# Patient Record
Sex: FEMALE | Race: BLACK OR AFRICAN AMERICAN | NOT HISPANIC OR LATINO
[De-identification: names, ages, dates, MRNs, and addresses within clinical notes are randomized per-mention and may not be internally consistent; named-entity substitution may affect disease eponyms.]

---

## 2017-01-27 ENCOUNTER — APPOINTMENT (OUTPATIENT)
Dept: INTERNAL MEDICINE | Facility: CLINIC | Age: 67
End: 2017-01-27

## 2017-01-27 VITALS
HEART RATE: 70 BPM | WEIGHT: 220 LBS | SYSTOLIC BLOOD PRESSURE: 146 MMHG | DIASTOLIC BLOOD PRESSURE: 84 MMHG | BODY MASS INDEX: 33.34 KG/M2 | HEIGHT: 68 IN | TEMPERATURE: 97.8 F | OXYGEN SATURATION: 97 %

## 2017-01-30 LAB
ALBUMIN SERPL ELPH-MCNC: 4 G/DL
ALP BLD-CCNC: 61 U/L
ALT SERPL-CCNC: 10 U/L
ANION GAP SERPL CALC-SCNC: 17 MMOL/L
AST SERPL-CCNC: 17 U/L
BASOPHILS # BLD AUTO: 0.03 K/UL
BASOPHILS NFR BLD AUTO: 0.4 %
BILIRUB SERPL-MCNC: 0.3 MG/DL
BUN SERPL-MCNC: 15 MG/DL
CALCIUM SERPL-MCNC: 9.8 MG/DL
CHLORIDE SERPL-SCNC: 103 MMOL/L
CHOLEST SERPL-MCNC: 196 MG/DL
CHOLEST/HDLC SERPL: 2.4 RATIO
CO2 SERPL-SCNC: 24 MMOL/L
CREAT SERPL-MCNC: 1.01 MG/DL
EOSINOPHIL # BLD AUTO: 0.22 K/UL
EOSINOPHIL NFR BLD AUTO: 3.3 %
GLUCOSE SERPL-MCNC: 87 MG/DL
HBA1C MFR BLD HPLC: 5.6 %
HCT VFR BLD CALC: 39.8 %
HDLC SERPL-MCNC: 82 MG/DL
HGB BLD-MCNC: 12.6 G/DL
HIV1+2 AB SPEC QL IA.RAPID: NONREACTIVE
IMM GRANULOCYTES NFR BLD AUTO: 0.1 %
LDLC SERPL CALC-MCNC: 103 MG/DL
LYMPHOCYTES # BLD AUTO: 3.07 K/UL
LYMPHOCYTES NFR BLD AUTO: 45.5 %
MAN DIFF?: NORMAL
MCHC RBC-ENTMCNC: 26.2 PG
MCHC RBC-ENTMCNC: 31.7 GM/DL
MCV RBC AUTO: 82.7 FL
MONOCYTES # BLD AUTO: 0.38 K/UL
MONOCYTES NFR BLD AUTO: 5.6 %
NEUTROPHILS # BLD AUTO: 3.03 K/UL
NEUTROPHILS NFR BLD AUTO: 45.1 %
PLATELET # BLD AUTO: 289 K/UL
POTASSIUM SERPL-SCNC: 4.1 MMOL/L
PROT SERPL-MCNC: 7.9 G/DL
RBC # BLD: 4.81 M/UL
RBC # FLD: 15.8 %
SODIUM SERPL-SCNC: 144 MMOL/L
TRIGL SERPL-MCNC: 57 MG/DL
TSH SERPL-ACNC: 1.89 UIU/ML
WBC # FLD AUTO: 6.74 K/UL

## 2017-06-06 ENCOUNTER — APPOINTMENT (OUTPATIENT)
Dept: INTERNAL MEDICINE | Facility: CLINIC | Age: 67
End: 2017-06-06

## 2017-06-06 VITALS
TEMPERATURE: 98.3 F | BODY MASS INDEX: 35.16 KG/M2 | SYSTOLIC BLOOD PRESSURE: 128 MMHG | DIASTOLIC BLOOD PRESSURE: 72 MMHG | HEART RATE: 72 BPM | WEIGHT: 232 LBS | OXYGEN SATURATION: 97 % | HEIGHT: 68 IN

## 2017-06-29 ENCOUNTER — RX RENEWAL (OUTPATIENT)
Age: 67
End: 2017-06-29

## 2017-07-17 ENCOUNTER — APPOINTMENT (OUTPATIENT)
Dept: INTERNAL MEDICINE | Facility: CLINIC | Age: 67
End: 2017-07-17

## 2017-07-17 VITALS
HEART RATE: 78 BPM | BODY MASS INDEX: 34.86 KG/M2 | WEIGHT: 230 LBS | DIASTOLIC BLOOD PRESSURE: 78 MMHG | HEIGHT: 68 IN | OXYGEN SATURATION: 98 % | SYSTOLIC BLOOD PRESSURE: 124 MMHG

## 2017-11-09 ENCOUNTER — APPOINTMENT (OUTPATIENT)
Dept: INTERNAL MEDICINE | Facility: CLINIC | Age: 67
End: 2017-11-09
Payer: COMMERCIAL

## 2017-11-09 VITALS
HEART RATE: 80 BPM | OXYGEN SATURATION: 98 % | HEIGHT: 68 IN | BODY MASS INDEX: 35.31 KG/M2 | TEMPERATURE: 97.8 F | DIASTOLIC BLOOD PRESSURE: 80 MMHG | SYSTOLIC BLOOD PRESSURE: 144 MMHG | WEIGHT: 233 LBS

## 2017-11-09 PROCEDURE — 99214 OFFICE O/P EST MOD 30 MIN: CPT

## 2017-11-09 RX ORDER — AMOXICILLIN 500 MG/1
500 TABLET, FILM COATED ORAL TWICE DAILY
Qty: 14 | Refills: 0 | Status: COMPLETED | COMMUNITY
Start: 2017-11-09 | End: 2017-11-16

## 2017-11-30 ENCOUNTER — APPOINTMENT (OUTPATIENT)
Dept: INTERNAL MEDICINE | Facility: CLINIC | Age: 67
End: 2017-11-30
Payer: COMMERCIAL

## 2017-11-30 VITALS
DIASTOLIC BLOOD PRESSURE: 80 MMHG | BODY MASS INDEX: 35.01 KG/M2 | WEIGHT: 231 LBS | TEMPERATURE: 98.2 F | HEART RATE: 87 BPM | HEIGHT: 68 IN | SYSTOLIC BLOOD PRESSURE: 140 MMHG | OXYGEN SATURATION: 98 %

## 2017-11-30 PROCEDURE — 99213 OFFICE O/P EST LOW 20 MIN: CPT

## 2017-12-04 LAB — BACTERIA THROAT CULT: NORMAL

## 2017-12-05 ENCOUNTER — RX RENEWAL (OUTPATIENT)
Age: 67
End: 2017-12-05

## 2017-12-11 LAB
BASOPHILS # BLD AUTO: 0.03 K/UL
BASOPHILS NFR BLD AUTO: 0.4 %
EOSINOPHIL # BLD AUTO: 0.17 K/UL
EOSINOPHIL NFR BLD AUTO: 2.4 %
HCT VFR BLD CALC: 39.8 %
HGB BLD-MCNC: 12.6 G/DL
IMM GRANULOCYTES NFR BLD AUTO: 0.1 %
LYMPHOCYTES # BLD AUTO: 2.13 K/UL
LYMPHOCYTES NFR BLD AUTO: 30.3 %
MAN DIFF?: NORMAL
MCHC RBC-ENTMCNC: 27.3 PG
MCHC RBC-ENTMCNC: 31.7 GM/DL
MCV RBC AUTO: 86.1 FL
MONOCYTES # BLD AUTO: 0.5 K/UL
MONOCYTES NFR BLD AUTO: 7.1 %
NEUTROPHILS # BLD AUTO: 4.19 K/UL
NEUTROPHILS NFR BLD AUTO: 59.7 %
PLATELET # BLD AUTO: 305 K/UL
RBC # BLD: 4.62 M/UL
RBC # FLD: 14.7 %
WBC # FLD AUTO: 7.03 K/UL

## 2018-01-01 ENCOUNTER — RX RENEWAL (OUTPATIENT)
Age: 68
End: 2018-01-01

## 2018-01-10 ENCOUNTER — APPOINTMENT (OUTPATIENT)
Dept: INTERNAL MEDICINE | Facility: CLINIC | Age: 68
End: 2018-01-10
Payer: COMMERCIAL

## 2018-01-10 VITALS
DIASTOLIC BLOOD PRESSURE: 80 MMHG | WEIGHT: 235 LBS | HEART RATE: 80 BPM | BODY MASS INDEX: 35.61 KG/M2 | OXYGEN SATURATION: 98 % | HEIGHT: 68 IN | SYSTOLIC BLOOD PRESSURE: 140 MMHG

## 2018-01-10 PROCEDURE — 99213 OFFICE O/P EST LOW 20 MIN: CPT

## 2018-01-29 ENCOUNTER — APPOINTMENT (OUTPATIENT)
Dept: INTERNAL MEDICINE | Facility: CLINIC | Age: 68
End: 2018-01-29
Payer: COMMERCIAL

## 2018-01-29 VITALS
BODY MASS INDEX: 35.61 KG/M2 | HEIGHT: 68 IN | HEART RATE: 72 BPM | DIASTOLIC BLOOD PRESSURE: 80 MMHG | RESPIRATION RATE: 14 BRPM | SYSTOLIC BLOOD PRESSURE: 130 MMHG | WEIGHT: 235 LBS

## 2018-01-29 DIAGNOSIS — Z87.09 PERSONAL HISTORY OF OTHER DISEASES OF THE RESPIRATORY SYSTEM: ICD-10-CM

## 2018-01-29 PROCEDURE — 99397 PER PM REEVAL EST PAT 65+ YR: CPT

## 2018-01-29 RX ORDER — BENZOCAINE AND MENTHOL 15; 2.6 MG/1; MG/1
15-2.6 LOZENGE ORAL EVERY 6 HOURS
Qty: 1 | Refills: 0 | Status: DISCONTINUED | COMMUNITY
Start: 2018-01-10 | End: 2018-01-29

## 2018-01-29 RX ORDER — ALBUTEROL SULFATE 90 UG/1
108 (90 BASE) AEROSOL, METERED RESPIRATORY (INHALATION)
Qty: 1 | Refills: 1 | Status: DISCONTINUED | COMMUNITY
Start: 2018-01-10 | End: 2018-01-29

## 2018-01-29 RX ORDER — BENZONATATE 100 MG/1
100 CAPSULE ORAL
Qty: 30 | Refills: 1 | Status: DISCONTINUED | COMMUNITY
Start: 2017-12-05 | End: 2018-01-29

## 2018-02-02 LAB
ALBUMIN SERPL ELPH-MCNC: 4 G/DL
ALP BLD-CCNC: 53 U/L
ALT SERPL-CCNC: 10 U/L
ANION GAP SERPL CALC-SCNC: 15 MMOL/L
AST SERPL-CCNC: 22 U/L
BASOPHILS # BLD AUTO: 0.02 K/UL
BASOPHILS NFR BLD AUTO: 0.3 %
BILIRUB SERPL-MCNC: 0.4 MG/DL
BUN SERPL-MCNC: 18 MG/DL
CALCIUM SERPL-MCNC: 9.8 MG/DL
CHLORIDE SERPL-SCNC: 98 MMOL/L
CHOLEST SERPL-MCNC: 164 MG/DL
CHOLEST/HDLC SERPL: 2.2 RATIO
CO2 SERPL-SCNC: 27 MMOL/L
CREAT SERPL-MCNC: 0.96 MG/DL
EOSINOPHIL # BLD AUTO: 0.15 K/UL
EOSINOPHIL NFR BLD AUTO: 2 %
GLUCOSE SERPL-MCNC: 89 MG/DL
HBA1C MFR BLD HPLC: 5.8 %
HCT VFR BLD CALC: 42 %
HDLC SERPL-MCNC: 73 MG/DL
HGB BLD-MCNC: 13.3 G/DL
IMM GRANULOCYTES NFR BLD AUTO: 0.1 %
LDLC SERPL CALC-MCNC: 78 MG/DL
LYMPHOCYTES # BLD AUTO: 2.43 K/UL
LYMPHOCYTES NFR BLD AUTO: 32.4 %
MAN DIFF?: NORMAL
MCHC RBC-ENTMCNC: 27.5 PG
MCHC RBC-ENTMCNC: 31.7 GM/DL
MCV RBC AUTO: 87 FL
MONOCYTES # BLD AUTO: 0.59 K/UL
MONOCYTES NFR BLD AUTO: 7.9 %
NEUTROPHILS # BLD AUTO: 4.29 K/UL
NEUTROPHILS NFR BLD AUTO: 57.3 %
PLATELET # BLD AUTO: 235 K/UL
POTASSIUM SERPL-SCNC: 4.1 MMOL/L
PROT SERPL-MCNC: 8 G/DL
RBC # BLD: 4.83 M/UL
RBC # FLD: 14.8 %
SODIUM SERPL-SCNC: 140 MMOL/L
TRIGL SERPL-MCNC: 67 MG/DL
TSH SERPL-ACNC: 2.33 UIU/ML
WBC # FLD AUTO: 7.49 K/UL

## 2018-03-21 ENCOUNTER — APPOINTMENT (OUTPATIENT)
Dept: CV DIAGNOSTICS | Facility: HOSPITAL | Age: 68
End: 2018-03-21

## 2018-03-21 ENCOUNTER — OUTPATIENT (OUTPATIENT)
Dept: OUTPATIENT SERVICES | Facility: HOSPITAL | Age: 68
LOS: 1 days | End: 2018-03-21
Payer: COMMERCIAL

## 2018-03-21 DIAGNOSIS — Z90.13 ACQUIRED ABSENCE OF BILATERAL BREASTS AND NIPPLES: Chronic | ICD-10-CM

## 2018-03-21 DIAGNOSIS — R07.9 CHEST PAIN, UNSPECIFIED: ICD-10-CM

## 2018-03-21 PROCEDURE — 93018 CV STRESS TEST I&R ONLY: CPT

## 2018-03-21 PROCEDURE — 78452 HT MUSCLE IMAGE SPECT MULT: CPT

## 2018-03-21 PROCEDURE — 93016 CV STRESS TEST SUPVJ ONLY: CPT

## 2018-03-21 PROCEDURE — A9500: CPT

## 2018-03-21 PROCEDURE — 78452 HT MUSCLE IMAGE SPECT MULT: CPT | Mod: 26

## 2018-03-21 PROCEDURE — 93017 CV STRESS TEST TRACING ONLY: CPT

## 2018-03-22 ENCOUNTER — FORM ENCOUNTER (OUTPATIENT)
Age: 68
End: 2018-03-22

## 2018-03-23 ENCOUNTER — APPOINTMENT (OUTPATIENT)
Dept: RADIOLOGY | Facility: CLINIC | Age: 68
End: 2018-03-23
Payer: COMMERCIAL

## 2018-03-23 ENCOUNTER — OUTPATIENT (OUTPATIENT)
Dept: OUTPATIENT SERVICES | Facility: HOSPITAL | Age: 68
LOS: 1 days | End: 2018-03-23
Payer: COMMERCIAL

## 2018-03-23 DIAGNOSIS — Z90.13 ACQUIRED ABSENCE OF BILATERAL BREASTS AND NIPPLES: Chronic | ICD-10-CM

## 2018-03-23 DIAGNOSIS — R07.9 CHEST PAIN, UNSPECIFIED: ICD-10-CM

## 2018-03-23 PROCEDURE — 71046 X-RAY EXAM CHEST 2 VIEWS: CPT

## 2018-03-23 PROCEDURE — 71046 X-RAY EXAM CHEST 2 VIEWS: CPT | Mod: 26

## 2018-03-28 ENCOUNTER — APPOINTMENT (OUTPATIENT)
Dept: CARDIOLOGY | Facility: CLINIC | Age: 68
End: 2018-03-28
Payer: COMMERCIAL

## 2018-03-28 ENCOUNTER — NON-APPOINTMENT (OUTPATIENT)
Age: 68
End: 2018-03-28

## 2018-03-28 VITALS
HEIGHT: 68 IN | OXYGEN SATURATION: 98 % | WEIGHT: 235 LBS | BODY MASS INDEX: 35.61 KG/M2 | DIASTOLIC BLOOD PRESSURE: 80 MMHG | HEART RATE: 70 BPM | SYSTOLIC BLOOD PRESSURE: 140 MMHG

## 2018-03-28 PROCEDURE — 99205 OFFICE O/P NEW HI 60 MIN: CPT

## 2018-03-28 PROCEDURE — 93000 ELECTROCARDIOGRAM COMPLETE: CPT

## 2018-04-18 ENCOUNTER — OUTPATIENT (OUTPATIENT)
Dept: OUTPATIENT SERVICES | Facility: HOSPITAL | Age: 68
LOS: 1 days | End: 2018-04-18
Payer: COMMERCIAL

## 2018-04-18 ENCOUNTER — APPOINTMENT (OUTPATIENT)
Dept: CV DIAGNOSITCS | Facility: HOSPITAL | Age: 68
End: 2018-04-18

## 2018-04-18 DIAGNOSIS — R07.9 CHEST PAIN, UNSPECIFIED: ICD-10-CM

## 2018-04-18 DIAGNOSIS — Z90.13 ACQUIRED ABSENCE OF BILATERAL BREASTS AND NIPPLES: Chronic | ICD-10-CM

## 2018-04-18 PROCEDURE — 93306 TTE W/DOPPLER COMPLETE: CPT

## 2018-04-18 PROCEDURE — 93306 TTE W/DOPPLER COMPLETE: CPT | Mod: 26

## 2018-04-24 ENCOUNTER — APPOINTMENT (OUTPATIENT)
Dept: CARDIOLOGY | Facility: CLINIC | Age: 68
End: 2018-04-24
Payer: COMMERCIAL

## 2018-04-24 ENCOUNTER — NON-APPOINTMENT (OUTPATIENT)
Age: 68
End: 2018-04-24

## 2018-04-24 VITALS
OXYGEN SATURATION: 98 % | DIASTOLIC BLOOD PRESSURE: 79 MMHG | HEART RATE: 75 BPM | BODY MASS INDEX: 35.61 KG/M2 | HEIGHT: 68 IN | WEIGHT: 235 LBS | SYSTOLIC BLOOD PRESSURE: 137 MMHG

## 2018-04-24 PROCEDURE — 99215 OFFICE O/P EST HI 40 MIN: CPT

## 2018-04-24 PROCEDURE — 93000 ELECTROCARDIOGRAM COMPLETE: CPT

## 2018-04-26 ENCOUNTER — APPOINTMENT (OUTPATIENT)
Dept: INTERNAL MEDICINE | Facility: CLINIC | Age: 68
End: 2018-04-26
Payer: COMMERCIAL

## 2018-04-26 VITALS
BODY MASS INDEX: 35.61 KG/M2 | OXYGEN SATURATION: 98 % | DIASTOLIC BLOOD PRESSURE: 80 MMHG | HEART RATE: 79 BPM | SYSTOLIC BLOOD PRESSURE: 140 MMHG | WEIGHT: 235 LBS | HEIGHT: 68 IN

## 2018-04-26 DIAGNOSIS — R06.09 OTHER FORMS OF DYSPNEA: ICD-10-CM

## 2018-04-26 DIAGNOSIS — R73.09 OTHER ABNORMAL GLUCOSE: ICD-10-CM

## 2018-04-26 DIAGNOSIS — Z87.898 PERSONAL HISTORY OF OTHER SPECIFIED CONDITIONS: ICD-10-CM

## 2018-04-26 PROCEDURE — 99213 OFFICE O/P EST LOW 20 MIN: CPT

## 2018-05-03 ENCOUNTER — OUTPATIENT (OUTPATIENT)
Dept: OUTPATIENT SERVICES | Facility: HOSPITAL | Age: 68
LOS: 1 days | End: 2018-05-03
Payer: COMMERCIAL

## 2018-05-03 VITALS
TEMPERATURE: 98 F | HEIGHT: 68 IN | RESPIRATION RATE: 16 BRPM | OXYGEN SATURATION: 100 % | WEIGHT: 229.94 LBS | SYSTOLIC BLOOD PRESSURE: 153 MMHG | DIASTOLIC BLOOD PRESSURE: 76 MMHG | HEART RATE: 63 BPM

## 2018-05-03 DIAGNOSIS — Z90.13 ACQUIRED ABSENCE OF BILATERAL BREASTS AND NIPPLES: Chronic | ICD-10-CM

## 2018-05-03 DIAGNOSIS — R94.39 ABNORMAL RESULT OF OTHER CARDIOVASCULAR FUNCTION STUDY: ICD-10-CM

## 2018-05-03 DIAGNOSIS — Z98.890 OTHER SPECIFIED POSTPROCEDURAL STATES: Chronic | ICD-10-CM

## 2018-05-03 LAB
ALBUMIN SERPL ELPH-MCNC: 4 G/DL — SIGNIFICANT CHANGE UP (ref 3.3–5)
ALP SERPL-CCNC: 69 U/L — SIGNIFICANT CHANGE UP (ref 40–120)
ALT FLD-CCNC: 14 U/L — SIGNIFICANT CHANGE UP (ref 10–45)
ANION GAP SERPL CALC-SCNC: 11 MMOL/L — SIGNIFICANT CHANGE UP (ref 5–17)
AST SERPL-CCNC: 16 U/L — SIGNIFICANT CHANGE UP (ref 10–40)
BILIRUB SERPL-MCNC: 0.5 MG/DL — SIGNIFICANT CHANGE UP (ref 0.2–1.2)
BUN SERPL-MCNC: 13 MG/DL — SIGNIFICANT CHANGE UP (ref 7–23)
CALCIUM SERPL-MCNC: 9.7 MG/DL — SIGNIFICANT CHANGE UP (ref 8.4–10.5)
CHLORIDE SERPL-SCNC: 100 MMOL/L — SIGNIFICANT CHANGE UP (ref 96–108)
CO2 SERPL-SCNC: 28 MMOL/L — SIGNIFICANT CHANGE UP (ref 22–31)
CREAT SERPL-MCNC: 0.93 MG/DL — SIGNIFICANT CHANGE UP (ref 0.5–1.3)
GLUCOSE SERPL-MCNC: 91 MG/DL — SIGNIFICANT CHANGE UP (ref 70–99)
HCT VFR BLD CALC: 41.3 % — SIGNIFICANT CHANGE UP (ref 34.5–45)
HGB BLD-MCNC: 13.6 G/DL — SIGNIFICANT CHANGE UP (ref 11.5–15.5)
MCHC RBC-ENTMCNC: 28.8 PG — SIGNIFICANT CHANGE UP (ref 27–34)
MCHC RBC-ENTMCNC: 33 GM/DL — SIGNIFICANT CHANGE UP (ref 32–36)
MCV RBC AUTO: 87.1 FL — SIGNIFICANT CHANGE UP (ref 80–100)
PLATELET # BLD AUTO: 238 K/UL — SIGNIFICANT CHANGE UP (ref 150–400)
POTASSIUM SERPL-MCNC: 3.7 MMOL/L — SIGNIFICANT CHANGE UP (ref 3.5–5.3)
POTASSIUM SERPL-SCNC: 3.7 MMOL/L — SIGNIFICANT CHANGE UP (ref 3.5–5.3)
PROT SERPL-MCNC: 7.9 G/DL — SIGNIFICANT CHANGE UP (ref 6–8.3)
RBC # BLD: 4.74 M/UL — SIGNIFICANT CHANGE UP (ref 3.8–5.2)
RBC # FLD: 13 % — SIGNIFICANT CHANGE UP (ref 10.3–14.5)
SODIUM SERPL-SCNC: 139 MMOL/L — SIGNIFICANT CHANGE UP (ref 135–145)
WBC # BLD: 7 K/UL — SIGNIFICANT CHANGE UP (ref 3.8–10.5)
WBC # FLD AUTO: 7 K/UL — SIGNIFICANT CHANGE UP (ref 3.8–10.5)

## 2018-05-03 PROCEDURE — 80053 COMPREHEN METABOLIC PANEL: CPT

## 2018-05-03 PROCEDURE — C1887: CPT

## 2018-05-03 PROCEDURE — 93454 CORONARY ARTERY ANGIO S&I: CPT | Mod: 26

## 2018-05-03 PROCEDURE — 93454 CORONARY ARTERY ANGIO S&I: CPT

## 2018-05-03 PROCEDURE — 76937 US GUIDE VASCULAR ACCESS: CPT | Mod: 26

## 2018-05-03 PROCEDURE — 76937 US GUIDE VASCULAR ACCESS: CPT

## 2018-05-03 PROCEDURE — 99152 MOD SED SAME PHYS/QHP 5/>YRS: CPT

## 2018-05-03 PROCEDURE — 85027 COMPLETE CBC AUTOMATED: CPT

## 2018-05-03 PROCEDURE — 93005 ELECTROCARDIOGRAM TRACING: CPT

## 2018-05-03 PROCEDURE — C1894: CPT

## 2018-05-03 PROCEDURE — C1769: CPT

## 2018-05-03 PROCEDURE — 93010 ELECTROCARDIOGRAM REPORT: CPT

## 2018-05-03 RX ORDER — RANOLAZINE 500 MG/1
500 TABLET, FILM COATED, EXTENDED RELEASE ORAL ONCE
Qty: 0 | Refills: 0 | Status: COMPLETED | OUTPATIENT
Start: 2018-05-03 | End: 2018-05-03

## 2018-05-03 RX ADMIN — RANOLAZINE 500 MILLIGRAM(S): 500 TABLET, FILM COATED, EXTENDED RELEASE ORAL at 10:00

## 2018-05-03 NOTE — H&P CARDIOLOGY - HISTORY OF PRESENT ILLNESS
68 y/o [place] female with PMHx with h/o Breast CA s/p b/l mastectomy tx with chemo and radiation, HTN and HLD endorsing exertional dyspnea - anginal equivalent x 6 months and episodes of left sided "sticking" chest pain - exertional and non, lasting seconds without accompanying symptoms with onset 3 months ago.   Patient was evaluated by her PMD and had Nuclear Stress Test with abnormal findings - report below.  She was subsequently referred to Dr. Cassidy - Cardiology and for evaluation and had TTE with normal findings, LVEF 60%.  Patient is now for [facility] for which she presents today.    Antianginals - Toprol and Ranexa    IMPRESSIONS:Abnormal Study  * Chest Pain: No chest pain with administration of  Regadenoson.  * Symptom: flushed, generalized malaise.  * HR Response: Appropriate.  * BP Response: Appropriate.  * Heart Rhythm: Sinus Rhythm - 76 BPM.  * Baseline ECG: Nonspecific ST-T wave abnormality.  * ECG Changes: No significant ischemic ST segment changes  beyond baseline abnormalities.  * Arrhythmia: None.  * Review of raw data shows: Breast attenuation artifact.,  Minor motion artifact.  * The left ventricle was enlarged. There are medium-sized,  moderate defects in the mid to distal anterior, mid to  distal anteroseptal, and apical walls that are mostly  fixed suggestive of infarct with mild renata-infact  ischemia.  * There is a small, mild to moderate defect in the basal  anteroseptal wall that is mostly fixed suggestive of  infarct with mild renata-infarct ischemia.  * Post-stress gated wall motion analysis was performed  (LVEF = 48 %;LVEDV = 113 ml.) revealing hypokinesis of the  mid to distal anterior, mid to distal anteroseptal, and  apical walls and reduced systolic thickening of the basal  anteroseptal wall.

## 2018-05-03 NOTE — H&P CARDIOLOGY - PMH
Bilateral malignant neoplasm of breast in female, unspecified site of breast    Essential hypertension    Hyperlipidemia, unspecified hyperlipidemia type Bilateral malignant neoplasm of breast in female, unspecified site of breast    Essential hypertension    History of cancer chemotherapy    History of radiation therapy    Hyperlipidemia, unspecified hyperlipidemia type

## 2018-05-09 PROBLEM — R06.09 DYSPNEA ON EXERTION: Status: RESOLVED | Noted: 2018-03-30 | Resolved: 2018-05-09

## 2018-05-09 PROBLEM — Z87.898 HISTORY OF CHEST PAIN: Status: RESOLVED | Noted: 2018-03-02 | Resolved: 2018-05-09

## 2018-06-04 ENCOUNTER — APPOINTMENT (OUTPATIENT)
Dept: INTERNAL MEDICINE | Facility: CLINIC | Age: 68
End: 2018-06-04
Payer: COMMERCIAL

## 2018-06-04 VITALS
BODY MASS INDEX: 35.31 KG/M2 | SYSTOLIC BLOOD PRESSURE: 114 MMHG | HEIGHT: 68 IN | DIASTOLIC BLOOD PRESSURE: 70 MMHG | HEART RATE: 67 BPM | WEIGHT: 233 LBS | OXYGEN SATURATION: 98 % | TEMPERATURE: 97.7 F

## 2018-06-04 PROCEDURE — 99214 OFFICE O/P EST MOD 30 MIN: CPT

## 2018-06-05 NOTE — PHYSICAL EXAM
[No Acute Distress] : no acute distress [Well Nourished] : well nourished [Well Developed] : well developed [Well-Appearing] : well-appearing [No JVD] : no jugular venous distention [No Respiratory Distress] : no respiratory distress  [Clear to Auscultation] : lungs were clear to auscultation bilaterally [No Accessory Muscle Use] : no accessory muscle use [Normal Rate] : normal rate  [Regular Rhythm] : with a regular rhythm [Normal S1, S2] : normal S1 and S2 [No Murmur] : no murmur heard [Pedal Pulses Present] : the pedal pulses are present [No Edema] : there was no peripheral edema

## 2018-06-05 NOTE — RESULTS/DATA
[] : results reviewed [de-identified] : wnl , hg 12.6 [de-identified] : wnl, cr 0.84 [de-identified] : SR @ 75,  [de-identified] : TTE 4/18/18 - normal study

## 2018-06-05 NOTE — REVIEW OF SYSTEMS
[Chest Pain] : chest pain [Fever] : no fever [Palpitations] : no palpitations [Shortness Of Breath] : no shortness of breath [Dyspnea on Exertion] : no dyspnea on exertion [Dizziness] : no dizziness [Fainting] : no fainting

## 2018-06-05 NOTE — ASSESSMENT
[High Risk Surgery - Intraperitoneal, Intrathoracic or Supringuinal Vascular Procedures] : High Risk Surgery - Intraperitoneal, Intrathoracic or Supringuinal Vascular Procedures - No (0) [Ischemic Heart Disease] : Ischemic Heart Disease - No (0) [Congestive Heart Failure] : Congestive Heart Failure - No (0) [Prior Cerebrovascular Accident or TIA] : Prior Cerebrovascular Accident or TIA - No (0) [Creatinine >= 2mg/dL (1 Point)] : Creatinine >= 2mg/dL - No (0) [Insulin-dependent Diabetic (1 Point)] : Insulin-dependent Diabetic - No (0) [0] : 0 , RCRI Class: I, Risk of Post-Op Cardiac Complications: 0.4%, Procedure Risk: Low-Risk [FreeTextEntry4] : 67 F w/ breast ca s/p BL mastectomy 2015, obesity, HTN presents for pre-op evaluation.  \par \par No RCRI predictors of renata-operative risk. Risk 0.4% of cardiac event. \par Will continue beta blocker throughout renata-operative period. \par No medical contraindications to procedure. \par \par Will fax to [phone]

## 2018-06-05 NOTE — HISTORY OF PRESENT ILLNESS
[Coronary Artery Disease] : no coronary artery disease [Diabetes] : no diabetes [Sleep Apnea] : no sleep apnea [COPD] : no COPD [Previous Adverse Anesthesia Reaction] : no previous adverse anesthesia reaction [FreeTextEntry1] : Cesar, d&C, hysteroscopy [FreeTextEntry2] : 6/6/18 [FreeTextEntry3] : Dr. Debbie Hodge ([facility])  [FreeTextEntry4] : 67 F w/ breast ca s/p BL mastectomy 2015, obesity, HTN presents for pre-op evaluation.  \par \par PAP test was abnormal and f/u colposcopy was abnormal. \par Recently w/ CP, abnl stress test, normal cardiac cath, to stop statin and beat blocker after procedure. \par \par Can walk up 2 flights of stairs without stopping. No h/o CAD, CHF, IDDM, CRI or CVA.

## 2018-06-27 ENCOUNTER — APPOINTMENT (OUTPATIENT)
Dept: OBGYN | Facility: CLINIC | Age: 68
End: 2018-06-27
Payer: COMMERCIAL

## 2018-06-27 VITALS
HEIGHT: 68 IN | SYSTOLIC BLOOD PRESSURE: 150 MMHG | DIASTOLIC BLOOD PRESSURE: 84 MMHG | BODY MASS INDEX: 35.92 KG/M2 | WEIGHT: 237 LBS

## 2018-06-27 PROCEDURE — 99202 OFFICE O/P NEW SF 15 MIN: CPT

## 2018-07-02 ENCOUNTER — RX RENEWAL (OUTPATIENT)
Age: 68
End: 2018-07-02

## 2018-07-03 ENCOUNTER — APPOINTMENT (OUTPATIENT)
Dept: INTERNAL MEDICINE | Facility: CLINIC | Age: 68
End: 2018-07-03

## 2018-07-06 ENCOUNTER — RESULT REVIEW (OUTPATIENT)
Age: 68
End: 2018-07-06

## 2018-07-09 ENCOUNTER — APPOINTMENT (OUTPATIENT)
Dept: INTERNAL MEDICINE | Facility: CLINIC | Age: 68
End: 2018-07-09
Payer: COMMERCIAL

## 2018-07-09 VITALS
TEMPERATURE: 98.6 F | HEART RATE: 78 BPM | WEIGHT: 235 LBS | BODY MASS INDEX: 35.61 KG/M2 | HEIGHT: 68 IN | OXYGEN SATURATION: 98 % | DIASTOLIC BLOOD PRESSURE: 70 MMHG | SYSTOLIC BLOOD PRESSURE: 126 MMHG

## 2018-07-09 PROCEDURE — 99214 OFFICE O/P EST MOD 30 MIN: CPT

## 2018-07-09 NOTE — HISTORY OF PRESENT ILLNESS
[FreeTextEntry8] : Presents with intermittent chest discomfort when taking a deep breath in. Denies radiation of pain to the arms or face. No palpitations, diaphoresis or dizziness. Had extensive cardiac workup, unrevealing. Was advised to see Pulmonologist by her oncologist, needs referral.

## 2018-07-09 NOTE — PLAN
[FreeTextEntry1] : \par Pulm referral given as requested for further evaluation of her shortness of breath and chest discomfort. Cardiac workup has been negative to date. Suspect possible pleuritis given discomfort with inspiration.

## 2018-07-09 NOTE — PHYSICAL EXAM
[No Acute Distress] : no acute distress [Well Nourished] : well nourished [Well Developed] : well developed [No JVD] : no jugular venous distention [Supple] : supple [No Respiratory Distress] : no respiratory distress  [Clear to Auscultation] : lungs were clear to auscultation bilaterally [No Accessory Muscle Use] : no accessory muscle use [Normal Rate] : normal rate  [Regular Rhythm] : with a regular rhythm [Normal S1, S2] : normal S1 and S2 [No Murmur] : no murmur heard [No Edema] : there was no peripheral edema [Soft] : abdomen soft [Non Tender] : non-tender [Non-distended] : non-distended [No HSM] : no HSM [Normal Bowel Sounds] : normal bowel sounds [No Joint Swelling] : no joint swelling [Grossly Normal Strength/Tone] : grossly normal strength/tone [Normal Gait] : normal gait [Coordination Grossly Intact] : coordination grossly intact [No Focal Deficits] : no focal deficits

## 2018-07-24 PROBLEM — E78.5 HYPERLIPIDEMIA, UNSPECIFIED: Chronic | Status: ACTIVE | Noted: 2018-05-03

## 2018-07-30 ENCOUNTER — OUTPATIENT (OUTPATIENT)
Dept: OUTPATIENT SERVICES | Facility: HOSPITAL | Age: 68
LOS: 1 days | End: 2018-07-30
Payer: COMMERCIAL

## 2018-07-30 ENCOUNTER — APPOINTMENT (OUTPATIENT)
Dept: INTERNAL MEDICINE | Facility: CLINIC | Age: 68
End: 2018-07-30

## 2018-07-30 VITALS
SYSTOLIC BLOOD PRESSURE: 124 MMHG | HEIGHT: 68 IN | HEART RATE: 77 BPM | WEIGHT: 238.98 LBS | RESPIRATION RATE: 14 BRPM | OXYGEN SATURATION: 98 % | DIASTOLIC BLOOD PRESSURE: 84 MMHG | TEMPERATURE: 98 F

## 2018-07-30 DIAGNOSIS — Z98.890 OTHER SPECIFIED POSTPROCEDURAL STATES: Chronic | ICD-10-CM

## 2018-07-30 DIAGNOSIS — I10 ESSENTIAL (PRIMARY) HYPERTENSION: ICD-10-CM

## 2018-07-30 DIAGNOSIS — Z90.13 ACQUIRED ABSENCE OF BILATERAL BREASTS AND NIPPLES: Chronic | ICD-10-CM

## 2018-07-30 DIAGNOSIS — Z01.818 ENCOUNTER FOR OTHER PREPROCEDURAL EXAMINATION: ICD-10-CM

## 2018-07-30 DIAGNOSIS — R06.02 SHORTNESS OF BREATH: ICD-10-CM

## 2018-07-30 DIAGNOSIS — N85.00 ENDOMETRIAL HYPERPLASIA, UNSPECIFIED: ICD-10-CM

## 2018-07-30 DIAGNOSIS — R87.613 HIGH GRADE SQUAMOUS INTRAEPITHELIAL LESION ON CYTOLOGIC SMEAR OF CERVIX (HGSIL): ICD-10-CM

## 2018-07-30 LAB
ANION GAP SERPL CALC-SCNC: 12 MMOL/L — SIGNIFICANT CHANGE UP (ref 5–17)
BUN SERPL-MCNC: 20 MG/DL — SIGNIFICANT CHANGE UP (ref 7–23)
CALCIUM SERPL-MCNC: 9.5 MG/DL — SIGNIFICANT CHANGE UP (ref 8.4–10.5)
CHLORIDE SERPL-SCNC: 100 MMOL/L — SIGNIFICANT CHANGE UP (ref 96–108)
CO2 SERPL-SCNC: 25 MMOL/L — SIGNIFICANT CHANGE UP (ref 22–31)
CREAT SERPL-MCNC: 1 MG/DL — SIGNIFICANT CHANGE UP (ref 0.5–1.3)
GLUCOSE SERPL-MCNC: 94 MG/DL — SIGNIFICANT CHANGE UP (ref 70–99)
HCT VFR BLD CALC: 40.2 % — SIGNIFICANT CHANGE UP (ref 34.5–45)
HGB BLD-MCNC: 13.2 G/DL — SIGNIFICANT CHANGE UP (ref 11.5–15.5)
MCHC RBC-ENTMCNC: 27.7 PG — SIGNIFICANT CHANGE UP (ref 27–34)
MCHC RBC-ENTMCNC: 32.8 GM/DL — SIGNIFICANT CHANGE UP (ref 32–36)
MCV RBC AUTO: 84.3 FL — SIGNIFICANT CHANGE UP (ref 80–100)
PLATELET # BLD AUTO: 266 K/UL — SIGNIFICANT CHANGE UP (ref 150–400)
POTASSIUM SERPL-MCNC: 3.9 MMOL/L — SIGNIFICANT CHANGE UP (ref 3.5–5.3)
POTASSIUM SERPL-SCNC: 3.9 MMOL/L — SIGNIFICANT CHANGE UP (ref 3.5–5.3)
RBC # BLD: 4.77 M/UL — SIGNIFICANT CHANGE UP (ref 3.8–5.2)
RBC # FLD: 15 % — HIGH (ref 10.3–14.5)
SODIUM SERPL-SCNC: 137 MMOL/L — SIGNIFICANT CHANGE UP (ref 135–145)
WBC # BLD: 6.2 K/UL — SIGNIFICANT CHANGE UP (ref 3.8–10.5)
WBC # FLD AUTO: 6.2 K/UL — SIGNIFICANT CHANGE UP (ref 3.8–10.5)

## 2018-07-30 PROCEDURE — 80048 BASIC METABOLIC PNL TOTAL CA: CPT

## 2018-07-30 PROCEDURE — 85027 COMPLETE CBC AUTOMATED: CPT

## 2018-07-30 PROCEDURE — G0463: CPT

## 2018-07-30 NOTE — H&P PST ADULT - ATTENDING COMMENTS
Patient seen by another provider for irregular bleeding and abnormal pap smear. Patient has a history of breast cancer. Pathology which was reviewed at [facility] is consistent with an ECC for HGSIL and embx consistent with disordered proliferative endometrium. patient to have cone biopsy for definitive diagnosis. Ella

## 2018-07-30 NOTE — H&P PST ADULT - HISTORY OF PRESENT ILLNESS
66 y/o [place] female with PMHx with h/o Breast CA s/p b/l mastectomy, HTN, w/ HGSIL on recent pap. 68 y/o [facility] female with PMHx of bilateral breast cancer s/p b/l mastectomy in 2016, HTN, w/ HGSIL on recent pap. Presents now for cone biopsy and D&C. She also reports experiencing 4 months of pain with deep inspiration. She has had a negative cardiac workup and was referred to see pulm for what is most likely pleuritis. She has not made an appt. yet but was sent for a CT scan and pulmonary function testing by her oncologist. She was told the tests were normal. She denies worsening of the pain over the last several month - denies SOB, cough, fever, chills. 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

## 2018-07-30 NOTE — H&P PST ADULT - PSH
H/O bilateral mastectomy  with DEIP flap reconstruction  2015  H/O lumpectomy    History of D&C  2014 for PMB  S/P laparoscopy  1978

## 2018-07-30 NOTE — H&P PST ADULT - PROBLEM SELECTOR PLAN 3
4 months of pain with deep inspiration - cardiac workup negative; good METs, O2 sat on RA 98% and lungs clear on exam - has a pulm appt on 8/9/18  CT scan requested from oncology office   case d/w Dr. Cespedes - no additional testing is needed

## 2018-07-30 NOTE — H&P PST ADULT - PMH
Bilateral malignant neoplasm of breast in female, unspecified site of breast  1996, 2015  bilateral breasts; denies R/T or chemo  Essential hypertension    HGSIL (high grade squamous intraepithelial lesion) on Pap smear of cervix    Hyperlipidemia, unspecified hyperlipidemia type    Obesity

## 2018-07-31 PROBLEM — Z92.3 PERSONAL HISTORY OF IRRADIATION: Chronic | Status: INACTIVE | Noted: 2018-05-03 | Resolved: 2018-07-30

## 2018-07-31 PROBLEM — Z92.21 PERSONAL HISTORY OF ANTINEOPLASTIC CHEMOTHERAPY: Chronic | Status: INACTIVE | Noted: 2018-05-03 | Resolved: 2018-07-30

## 2018-08-03 PROBLEM — E66.9 OBESITY, UNSPECIFIED: Chronic | Status: ACTIVE | Noted: 2018-07-30

## 2018-08-03 PROBLEM — R87.613 HIGH GRADE SQUAMOUS INTRAEPITHELIAL LESION ON CYTOLOGIC SMEAR OF CERVIX (HGSIL): Chronic | Status: ACTIVE | Noted: 2018-07-30

## 2018-08-06 ENCOUNTER — APPOINTMENT (OUTPATIENT)
Dept: PULMONOLOGY | Facility: CLINIC | Age: 68
End: 2018-08-06

## 2018-08-20 ENCOUNTER — APPOINTMENT (OUTPATIENT)
Dept: PULMONOLOGY | Facility: CLINIC | Age: 68
End: 2018-08-20
Payer: COMMERCIAL

## 2018-08-20 VITALS
BODY MASS INDEX: 35.77 KG/M2 | SYSTOLIC BLOOD PRESSURE: 130 MMHG | OXYGEN SATURATION: 96 % | HEIGHT: 68 IN | RESPIRATION RATE: 17 BRPM | DIASTOLIC BLOOD PRESSURE: 70 MMHG | WEIGHT: 236 LBS | HEART RATE: 63 BPM

## 2018-08-20 PROCEDURE — 94729 DIFFUSING CAPACITY: CPT

## 2018-08-20 PROCEDURE — ZZZZZ: CPT

## 2018-08-20 PROCEDURE — 94060 EVALUATION OF WHEEZING: CPT

## 2018-08-20 PROCEDURE — 94726 PLETHYSMOGRAPHY LUNG VOLUMES: CPT

## 2018-08-20 PROCEDURE — 99204 OFFICE O/P NEW MOD 45 MIN: CPT | Mod: 25

## 2018-08-20 RX ORDER — METOPROLOL SUCCINATE 25 MG/1
25 TABLET, EXTENDED RELEASE ORAL DAILY
Qty: 45 | Refills: 1 | Status: DISCONTINUED | COMMUNITY
Start: 2018-03-28 | End: 2018-08-20

## 2018-08-20 RX ORDER — ASPIRIN 81 MG/1
81 TABLET, COATED ORAL DAILY
Qty: 30 | Refills: 5 | Status: DISCONTINUED | COMMUNITY
Start: 2018-03-28 | End: 2018-08-20

## 2018-08-20 RX ORDER — ATORVASTATIN CALCIUM 80 MG/1
80 TABLET, FILM COATED ORAL
Qty: 30 | Refills: 5 | Status: DISCONTINUED | COMMUNITY
Start: 2018-03-28 | End: 2018-08-20

## 2018-08-22 ENCOUNTER — NON-APPOINTMENT (OUTPATIENT)
Age: 68
End: 2018-08-22

## 2018-08-22 ENCOUNTER — APPOINTMENT (OUTPATIENT)
Dept: INTERNAL MEDICINE | Facility: CLINIC | Age: 68
End: 2018-08-22
Payer: COMMERCIAL

## 2018-08-22 VITALS
OXYGEN SATURATION: 98 % | TEMPERATURE: 98.9 F | BODY MASS INDEX: 35.61 KG/M2 | SYSTOLIC BLOOD PRESSURE: 132 MMHG | HEIGHT: 68 IN | WEIGHT: 235 LBS | DIASTOLIC BLOOD PRESSURE: 90 MMHG | HEART RATE: 77 BPM

## 2018-08-22 DIAGNOSIS — Z87.898 PERSONAL HISTORY OF OTHER SPECIFIED CONDITIONS: ICD-10-CM

## 2018-08-22 DIAGNOSIS — R07.81 PLEURODYNIA: ICD-10-CM

## 2018-08-22 PROCEDURE — 99214 OFFICE O/P EST MOD 30 MIN: CPT | Mod: 25

## 2018-08-22 PROCEDURE — 93000 ELECTROCARDIOGRAM COMPLETE: CPT

## 2018-08-22 NOTE — HISTORY OF PRESENT ILLNESS
[No Adverse Anesthesia Reaction] : no adverse anesthesia reaction in self or family member [(Patient denies any chest pain, claudication, dyspnea on exertion, orthopnea, palpitations or syncope)] : Patient denies any chest pain, claudication, dyspnea on exertion, orthopnea, palpitations or syncope [Excellent (>10 METs)] : Excellent (>10 METs) [Aortic Stenosis] : no aortic stenosis [Atrial Fibrillation] : no atrial fibrillation [Coronary Artery Disease] : no coronary artery disease [Recent Myocardial Infarction] : no recent myocardial infarction [Implantable Device/Pacemaker] : no implantable device/pacemaker [Asthma] : no asthma [COPD] : no COPD [Sleep Apnea] : no sleep apnea [Smoker] : not a smoker [FreeTextEntry1] : cone biopsy with D&C [FreeTextEntry2] : 8/24/18 [FreeTextEntry3] : Dr. Genesis Zapata

## 2018-08-22 NOTE — ASSESSMENT
[Patient Optimized for Surgery] : Patient optimized for surgery [No Further Testing Recommended] : no further testing recommended [FreeTextEntry4] : \par Patient is a low-risk patient for an intermediate-risk procedure. Appreciate cardiac and pulmonology evaluations. There is currently no medical contraindication to proceed with the planned surgery at this time. EKG and labs reviewed, all stable.

## 2018-08-30 ENCOUNTER — OUTPATIENT (OUTPATIENT)
Dept: OUTPATIENT SERVICES | Facility: HOSPITAL | Age: 68
LOS: 1 days | End: 2018-08-30
Payer: COMMERCIAL

## 2018-08-30 VITALS
HEART RATE: 71 BPM | RESPIRATION RATE: 16 BRPM | TEMPERATURE: 98 F | OXYGEN SATURATION: 98 % | DIASTOLIC BLOOD PRESSURE: 81 MMHG | SYSTOLIC BLOOD PRESSURE: 144 MMHG | WEIGHT: 235.01 LBS | HEIGHT: 68 IN

## 2018-08-30 DIAGNOSIS — Z01.818 ENCOUNTER FOR OTHER PREPROCEDURAL EXAMINATION: ICD-10-CM

## 2018-08-30 DIAGNOSIS — Z98.890 OTHER SPECIFIED POSTPROCEDURAL STATES: Chronic | ICD-10-CM

## 2018-08-30 DIAGNOSIS — R07.89 OTHER CHEST PAIN: ICD-10-CM

## 2018-08-30 DIAGNOSIS — N85.00 ENDOMETRIAL HYPERPLASIA, UNSPECIFIED: ICD-10-CM

## 2018-08-30 DIAGNOSIS — Z90.13 ACQUIRED ABSENCE OF BILATERAL BREASTS AND NIPPLES: Chronic | ICD-10-CM

## 2018-08-30 LAB
ANION GAP SERPL CALC-SCNC: 12 MMOL/L — SIGNIFICANT CHANGE UP (ref 5–17)
BUN SERPL-MCNC: 15 MG/DL — SIGNIFICANT CHANGE UP (ref 7–23)
CALCIUM SERPL-MCNC: 9.7 MG/DL — SIGNIFICANT CHANGE UP (ref 8.4–10.5)
CHLORIDE SERPL-SCNC: 99 MMOL/L — SIGNIFICANT CHANGE UP (ref 96–108)
CO2 SERPL-SCNC: 26 MMOL/L — SIGNIFICANT CHANGE UP (ref 22–31)
CREAT SERPL-MCNC: 0.83 MG/DL — SIGNIFICANT CHANGE UP (ref 0.5–1.3)
GLUCOSE SERPL-MCNC: 92 MG/DL — SIGNIFICANT CHANGE UP (ref 70–99)
HCT VFR BLD CALC: 39.5 % — SIGNIFICANT CHANGE UP (ref 34.5–45)
HGB BLD-MCNC: 12.5 G/DL — SIGNIFICANT CHANGE UP (ref 11.5–15.5)
MCHC RBC-ENTMCNC: 26.9 PG — LOW (ref 27–34)
MCHC RBC-ENTMCNC: 31.6 GM/DL — LOW (ref 32–36)
MCV RBC AUTO: 84.9 FL — SIGNIFICANT CHANGE UP (ref 80–100)
PLATELET # BLD AUTO: 287 K/UL — SIGNIFICANT CHANGE UP (ref 150–400)
POTASSIUM SERPL-MCNC: 3.6 MMOL/L — SIGNIFICANT CHANGE UP (ref 3.5–5.3)
POTASSIUM SERPL-SCNC: 3.6 MMOL/L — SIGNIFICANT CHANGE UP (ref 3.5–5.3)
RBC # BLD: 4.65 M/UL — SIGNIFICANT CHANGE UP (ref 3.8–5.2)
RBC # FLD: 14.9 % — HIGH (ref 10.3–14.5)
SODIUM SERPL-SCNC: 137 MMOL/L — SIGNIFICANT CHANGE UP (ref 135–145)
WBC # BLD: 6.28 K/UL — SIGNIFICANT CHANGE UP (ref 3.8–10.5)
WBC # FLD AUTO: 6.28 K/UL — SIGNIFICANT CHANGE UP (ref 3.8–10.5)

## 2018-08-30 PROCEDURE — G0463: CPT

## 2018-08-30 PROCEDURE — 80048 BASIC METABOLIC PNL TOTAL CA: CPT

## 2018-08-30 PROCEDURE — 85027 COMPLETE CBC AUTOMATED: CPT

## 2018-08-30 RX ORDER — SODIUM CHLORIDE 9 MG/ML
3 INJECTION INTRAMUSCULAR; INTRAVENOUS; SUBCUTANEOUS EVERY 8 HOURS
Qty: 0 | Refills: 0 | Status: DISCONTINUED | OUTPATIENT
Start: 2018-09-06 | End: 2018-09-21

## 2018-08-30 RX ORDER — LIDOCAINE HCL 20 MG/ML
0.2 VIAL (ML) INJECTION ONCE
Qty: 0 | Refills: 0 | Status: DISCONTINUED | OUTPATIENT
Start: 2018-09-06 | End: 2018-09-21

## 2018-08-30 NOTE — H&P PST ADULT - HISTORY OF PRESENT ILLNESS
66 y/o female with PMHx of bilateral breast cancer s/p b/l mastectomy in 2016, HTN, w/ HGSIL on recent pap. Presents now for cone biopsy and D&C.    Of note, surgery was originally scheduled for 08/06/18, but was postponed due to need for pulmonary work-up. At PST on 07/30/18 patient has  reported experiencing 4 months of chest pain with deep inspiration. She has had a negative cardiac workup and was referred to see pulmonologist.  CT scan and pulmonary function testing were normal. She denies worsening of the pain over the last several month - denies SOB, cough, fever, chills. 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 Patient was seen by Dr. DEXTER John on 08/20/18. As per pulmonology, pleuritic chest pain is most likely related to post-surgical thoracic restriction due to breast and nipple reconstruction (see note). Patient is cleared for surgery from pulmonary and cardiac perspective.

## 2018-08-30 NOTE — H&P PST ADULT - ACTIVITY
pt able to tolerate strenuous activity; has been experiencing 4 months of pain with deep inspiration

## 2018-08-30 NOTE — H&P PST ADULT - PSH
H/O bilateral mastectomy  with DUNCAN flap reconstruction, 2015  H/O lumpectomy  - Lefr, with chemo/RT, 1996  History of D&C  2014 for PMB  S/P laparoscopy  1978

## 2018-08-30 NOTE — H&P PST ADULT - ATTENDING COMMENTS
Patient for cone biopsy. Patient had a colposcopy by another MD  Path reviewed at [facility] reveals HGSIL on biopsy and on ECC   Patient also had an embx that revealed disordered endometrium.  She will also have a D & C

## 2018-09-05 ENCOUNTER — TRANSCRIPTION ENCOUNTER (OUTPATIENT)
Age: 68
End: 2018-09-05

## 2018-09-06 ENCOUNTER — RESULT REVIEW (OUTPATIENT)
Age: 68
End: 2018-09-06

## 2018-09-06 ENCOUNTER — APPOINTMENT (OUTPATIENT)
Dept: OBGYN | Facility: CLINIC | Age: 68
End: 2018-09-06

## 2018-09-06 ENCOUNTER — OUTPATIENT (OUTPATIENT)
Dept: OUTPATIENT SERVICES | Facility: HOSPITAL | Age: 68
LOS: 1 days | End: 2018-09-06
Payer: COMMERCIAL

## 2018-09-06 VITALS
TEMPERATURE: 98 F | HEIGHT: 68 IN | DIASTOLIC BLOOD PRESSURE: 74 MMHG | WEIGHT: 235.01 LBS | OXYGEN SATURATION: 97 % | HEART RATE: 86 BPM | SYSTOLIC BLOOD PRESSURE: 129 MMHG | RESPIRATION RATE: 18 BRPM

## 2018-09-06 VITALS
HEART RATE: 61 BPM | OXYGEN SATURATION: 100 % | SYSTOLIC BLOOD PRESSURE: 133 MMHG | RESPIRATION RATE: 14 BRPM | DIASTOLIC BLOOD PRESSURE: 61 MMHG

## 2018-09-06 DIAGNOSIS — Z98.890 OTHER SPECIFIED POSTPROCEDURAL STATES: Chronic | ICD-10-CM

## 2018-09-06 DIAGNOSIS — Z90.13 ACQUIRED ABSENCE OF BILATERAL BREASTS AND NIPPLES: Chronic | ICD-10-CM

## 2018-09-06 DIAGNOSIS — N85.00 ENDOMETRIAL HYPERPLASIA, UNSPECIFIED: ICD-10-CM

## 2018-09-06 PROCEDURE — 88305 TISSUE EXAM BY PATHOLOGIST: CPT

## 2018-09-06 PROCEDURE — 57522 CONIZATION OF CERVIX: CPT

## 2018-09-06 PROCEDURE — 57520 CONIZATION OF CERVIX: CPT

## 2018-09-06 PROCEDURE — 88307 TISSUE EXAM BY PATHOLOGIST: CPT

## 2018-09-06 PROCEDURE — 88305 TISSUE EXAM BY PATHOLOGIST: CPT | Mod: 26

## 2018-09-06 PROCEDURE — 88307 TISSUE EXAM BY PATHOLOGIST: CPT | Mod: 26

## 2018-09-06 RX ORDER — SODIUM CHLORIDE 9 MG/ML
1000 INJECTION, SOLUTION INTRAVENOUS
Qty: 0 | Refills: 0 | Status: DISCONTINUED | OUTPATIENT
Start: 2018-09-06 | End: 2018-09-21

## 2018-09-06 RX ORDER — ONDANSETRON 8 MG/1
4 TABLET, FILM COATED ORAL ONCE
Qty: 0 | Refills: 0 | Status: DISCONTINUED | OUTPATIENT
Start: 2018-09-06 | End: 2018-09-21

## 2018-09-06 RX ORDER — OXYCODONE HYDROCHLORIDE 5 MG/1
10 TABLET ORAL ONCE
Qty: 0 | Refills: 0 | Status: DISCONTINUED | OUTPATIENT
Start: 2018-09-06 | End: 2018-09-06

## 2018-09-06 RX ORDER — ACETAMINOPHEN 500 MG
1000 TABLET ORAL ONCE
Qty: 0 | Refills: 0 | Status: COMPLETED | OUTPATIENT
Start: 2018-09-06 | End: 2018-09-06

## 2018-09-06 RX ORDER — FAMOTIDINE 10 MG/ML
20 INJECTION INTRAVENOUS ONCE
Qty: 0 | Refills: 0 | Status: COMPLETED | OUTPATIENT
Start: 2018-09-06 | End: 2018-09-06

## 2018-09-06 RX ORDER — CELECOXIB 200 MG/1
200 CAPSULE ORAL ONCE
Qty: 0 | Refills: 0 | Status: DISCONTINUED | OUTPATIENT
Start: 2018-09-06 | End: 2018-09-21

## 2018-09-06 RX ORDER — CELECOXIB 200 MG/1
200 CAPSULE ORAL ONCE
Qty: 0 | Refills: 0 | Status: COMPLETED | OUTPATIENT
Start: 2018-09-06 | End: 2018-09-06

## 2018-09-06 RX ORDER — OXYCODONE HYDROCHLORIDE 5 MG/1
5 TABLET ORAL ONCE
Qty: 0 | Refills: 0 | Status: DISCONTINUED | OUTPATIENT
Start: 2018-09-06 | End: 2018-09-06

## 2018-09-06 RX ADMIN — CELECOXIB 200 MILLIGRAM(S): 200 CAPSULE ORAL at 10:54

## 2018-09-06 RX ADMIN — FAMOTIDINE 20 MILLIGRAM(S): 10 INJECTION INTRAVENOUS at 10:54

## 2018-09-06 RX ADMIN — Medication 1000 MILLIGRAM(S): at 10:54

## 2018-09-06 NOTE — BRIEF OPERATIVE NOTE - POST-OP DX
High grade squamous intraepithelial cervical dysplasia  09/06/2018    Active  Erma Webber  Postmenopausal bleeding  09/06/2018    Active  Erma Webber

## 2018-09-06 NOTE — ASU DISCHARGE PLAN (ADULT/PEDIATRIC). - ACTIVITY LEVEL
nothing per rectum/no douching/until you see your doctor/no tub baths/no intercourse/nothing per vagina no tampons/nothing per vagina/no tub baths/no douching/until you see your doctor/nothing per rectum/no intercourse

## 2018-09-06 NOTE — PRE-ANESTHESIA EVALUATION ADULT - NSANTHPMHFT_GEN_ALL_CORE
Denies cardiopulmonary disease (recent cardiac and pulmonary workups for pleuritic chest pain- both negative), denies recent illness, denies GERD

## 2018-09-06 NOTE — ASU PATIENT PROFILE, ADULT - PMH
Bilateral malignant neoplasm of breast in female, unspecified site of breast  1996 -  Left, 2015  - Right  Chest pain, non-cardiac    Essential hypertension    HGSIL (high grade squamous intraepithelial lesion) on Pap smear of cervix    Hyperlipidemia, unspecified hyperlipidemia type    Obesity

## 2018-09-06 NOTE — ASU DISCHARGE PLAN (ADULT/PEDIATRIC). - NOTIFY
Fever greater than 101/Persistent Nausea and Vomiting/Unable to Urinate/Bleeding that does not stop/Pain not relieved by Medications/GYN Fever>100.4/Inability to Tolerate Liquids or Foods

## 2018-09-06 NOTE — BRIEF OPERATIVE NOTE - PROCEDURE
<<-----Click on this checkbox to enter Procedure Dilation and curettage  09/06/2018    Active  SBEALE  Cone biopsy of cervix  09/06/2018    Active  SBEALE

## 2018-09-06 NOTE — ASU DISCHARGE PLAN (ADULT/PEDIATRIC). - MEDICATION SUMMARY - MEDICATIONS TO TAKE
I will START or STAY ON the medications listed below when I get home from the hospital:    Tylenol 325 mg oral tablet  -- 2 tab(s) by mouth every 4 hours, As Needed  -- Indication: For pain    ibuprofen 600 mg oral tablet  -- 1 tab(s) by mouth every 6 hours, As Needed  -- Indication: For pain    valsartan-hydrochlorothiazide 80mg-12.5mg oral tablet  -- 1 tab(s) by mouth once a day  -- Indication: For home med

## 2018-09-11 LAB — SURGICAL PATHOLOGY STUDY: SIGNIFICANT CHANGE UP

## 2018-09-12 PROBLEM — R07.89 OTHER CHEST PAIN: Chronic | Status: ACTIVE | Noted: 2018-08-30

## 2018-09-18 ENCOUNTER — APPOINTMENT (OUTPATIENT)
Dept: PULMONOLOGY | Facility: CLINIC | Age: 68
End: 2018-09-18

## 2018-10-02 ENCOUNTER — APPOINTMENT (OUTPATIENT)
Dept: OBGYN | Facility: CLINIC | Age: 68
End: 2018-10-02
Payer: COMMERCIAL

## 2018-10-02 VITALS
WEIGHT: 238.38 LBS | DIASTOLIC BLOOD PRESSURE: 83 MMHG | SYSTOLIC BLOOD PRESSURE: 147 MMHG | BODY MASS INDEX: 38.31 KG/M2 | HEIGHT: 66 IN | HEART RATE: 67 BPM

## 2018-10-02 PROCEDURE — 99024 POSTOP FOLLOW-UP VISIT: CPT

## 2018-12-07 NOTE — REVIEW OF SYSTEMS
After Visit Summary   12/7/2018    Abiola Matute    MRN: [id number]           Patient Information     Date Of Birth          1962        Visit Information        Provider Department      12/7/2018 3:27 PM Philly Goncalves MD [facility]        Today's Diagnoses     BP check    -  1       Follow-ups after your visit        Your next 10 appointments already scheduled     Jan 23, 2019  8:00 AM CST   (Arrive by 7:45 AM)   INFLAMMATORY BOWEL DISEASE with Michelle Diaz MD   [facility] ([facility])    [address] [place] [id number] [phone]              Who to contact     If you have questions or need follow up information about today's clinic visit or your schedule please contact [facility] directly at [phone].  Normal or non-critical lab and imaging results will be communicated to you by MyChart, letter or phone within 4 business days after the clinic has received the results. If you do not hear from us within 7 days, please contact the clinic through MyChart or phone. If you have a critical or abnormal lab result, we will notify you by phone as soon as possible.  Submit refill requests through [facility] or call your pharmacy and they will forward the refill request to us. Please allow 3 business days for your refill to be completed.          Additional Information About Your Visit        MyChart Information     [employer] gives you secure access to your electronic health record. If you see a primary care provider, you can also send messages to your care team and make appointments. If you have questions, please call your primary care clinic.  If you do not have a primary care provider, please call [phone] and they will assist you.        Care EveryWhere ID     This is your Care EveryWhere ID. This could be used by other organizations to access your [facility]  records  [id number]        Your Vitals Were     Last Period                   05/31/2006            Blood Pressure from Last 3 Encounters:   12/07/18 135/78   09/20/18 (!) 149/94   09/01/18 128/76    Weight from Last 3 Encounters:   09/20/18 199 lb 4.8 oz (90.4 kg)   09/01/18 194 lb (88 kg)   02/26/18 202 lb 8 oz (91.9 kg)              Today, you had the following     No orders found for display       Primary Care Provider Office Phone # Fax #    Philly Goncalves [phone]       [address] [facility] [address]        Equal Access to Services     WES CHAN : Henrik Ramirez, wakarishma thomson, qaybta kaalmada mago, hudson ogden. So [facility] [phone].    ATENCIÓN: Si habla español, tiene a knight disposición servicios gratuitos de asistencia lingüística. Llame al [phone].    We comply with applicable federal civil rights laws and [place] laws. We do not discriminate on the basis of race, color, national origin, age, disability, sex, sexual orientation, or gender identity.            Thank you!     Thank you for choosing [facility]  for your care. Our goal is always to provide you with excellent care. Hearing back from our patients is one way we can continue to improve our services. Please take a few minutes to complete the written survey that you may receive in the mail after your visit with us. Thank you!             Your Updated Medication List - Protect others around you: Learn how to safely use, store and throw away your medicines at www.disposemymeds.org.          This list is accurate as of 12/7/18  3:28 PM.  Always use your most recent med list.                   Brand Name Dispense Instructions for use Diagnosis    CALCIUM 500 PO      Take 1 tablet by mouth daily    Screening for osteoporosis       lisinopril 10 MG tablet    PRINIVIL/ZESTRIL    90 tablet    TAKE ONE TABLET BY MOUTH DAILY    Essential  hypertension with goal blood pressure less than 140/90       LORazepam 0.5 MG tablet    ATIVAN    10 tablet    Take 1 tablet by mouth. 1-2 tabs 30-60 minutes prior to exam/procedure/flight    Claustrophobia       MULTIVITAMIN & MINERAL PO      Take  by mouth daily.    Routine general medical examination at a health care facility, Iron deficiency anemia, unspecified       vitamin D3 1000 units (25 mcg) tablet    CHOLECALCIFEROL     Take 1 tablet by mouth daily.    Screening for osteoporosis          [Shortness Of Breath] : shortness of breath [Wheezing] : no wheezing [Cough] : no cough [Dyspnea on Exertion] : no dyspnea on exertion [Negative] : Heme/Lymph

## 2019-01-02 ENCOUNTER — RX RENEWAL (OUTPATIENT)
Age: 69
End: 2019-01-02

## 2019-03-01 ENCOUNTER — APPOINTMENT (OUTPATIENT)
Dept: INTERNAL MEDICINE | Facility: CLINIC | Age: 69
End: 2019-03-01
Payer: COMMERCIAL

## 2019-03-01 VITALS
HEIGHT: 66 IN | SYSTOLIC BLOOD PRESSURE: 140 MMHG | OXYGEN SATURATION: 98 % | WEIGHT: 238 LBS | BODY MASS INDEX: 38.25 KG/M2 | HEART RATE: 82 BPM | TEMPERATURE: 97.8 F | DIASTOLIC BLOOD PRESSURE: 74 MMHG

## 2019-03-01 PROCEDURE — 99397 PER PM REEVAL EST PAT 65+ YR: CPT

## 2019-03-01 PROCEDURE — 99214 OFFICE O/P EST MOD 30 MIN: CPT | Mod: 25

## 2019-03-01 PROCEDURE — G0447 BEHAVIOR COUNSEL OBESITY 15M: CPT

## 2019-03-01 PROCEDURE — G0442 ANNUAL ALCOHOL SCREEN 15 MIN: CPT

## 2019-03-01 PROCEDURE — G0444 DEPRESSION SCREEN ANNUAL: CPT

## 2019-03-02 NOTE — HEALTH RISK ASSESSMENT
[Good] : ~his/her~  mood as  good [] : No [No falls in past year] : Patient reported no falls in the past year [0] : 2) Feeling down, depressed, or hopeless: Not at all (0) [de-identified] : GYN [CAC1Luuye] : 0 [Change in mental status noted] : No change in mental status noted [Language] : denies difficulty with language [Behavior] : denies difficulty with behavior [Learning/Retaining New Information] : denies difficulty learning/retaining new information [Handling Complex Tasks] : denies difficulty handling complex tasks [Reasoning] : denies difficulty with reasoning [Spatial Ability and Orientation] : denies difficulty with spatial ability and orientation [None] : None [Alone] : lives alone [Employed] : employed [Sexually Active] : not sexually active [High Risk Behavior] : no high risk behavior [Feels Safe at Home] : Feels safe at home [Fully functional (bathing, dressing, toileting, transferring, walking, feeding)] : Fully functional (bathing, dressing, toileting, transferring, walking, feeding) [Fully functional (using the telephone, shopping, preparing meals, housekeeping, doing laundry, using] : Fully functional and needs no help or supervision to perform IADLs (using the telephone, shopping, preparing meals, housekeeping, doing laundry, using transportation, managing medications and managing finances) [Reports changes in hearing] : Reports no changes in hearing [Reports changes in vision] : Reports no changes in vision [Reports normal functional visual acuity (ie: able to read med bottle)] : Reports normal functional visual acuity [Reports changes in dental health] : Reports no changes in dental health [Smoke Detector] : smoke detector [Carbon Monoxide Detector] : carbon monoxide detector [Guns at Home] : no guns at home [Safety elements used in home] : safety elements used in home [Seat Belt] :  uses seat belt [Sunscreen] : uses sunscreen [Travel to Developing Areas] : does not  travel to developing areas [TB Exposure] : is not being exposed to tuberculosis [Caregiver Concerns] : does not have caregiver concerns [Reviewed no changes] : Reviewed no changes [AdvancecareDate] : 02/19

## 2019-03-02 NOTE — ASSESSMENT
[FreeTextEntry1] : \par Preventive visit: pt says she already received the flu vaccine this season; she sees her GYN for breast/PAP/pelvic exams; she is up to date with her colonoscopy; praised pt's tobacco-free lifestyle; encouraged use of smoke/CO detectors in the house and use of seatbelts when in vehicles\par \par Medical Issue 1: Hypertension: BP stable, refilled medication Valsartan-HCTZ; check renal panel today\par \par Medical Issue 2: Obesity counselling: 15 mins, assessed BMI at 30 and above now in obese range; advised weight loss, exercise routine and diet; pt agreed to start exercise program for target weight loss 20 lbs; assisted patient with resources including nutrition referral as needed and arranged for follow-up to monitor weight loss progress over next several months\par \par Medical Issue 3: Breast cancer: coordinated follow-up care with her breast surgeon and oncologist\par \par Medical Issue 4: Endometrial hyperplasia: coordinated follow-up care with her GYN as she is due for repeat PAP in 6 months from last procedure for surveillance\par \par Depression screen performed today, PHQ2=0\par \par Annual alcohol misuse screen, 15 mins, done; negative

## 2019-03-02 NOTE — HISTORY OF PRESENT ILLNESS
[FreeTextEntry1] : Presents for preventive visit as well as concerns regarding hypertension, obesity, breast cancer and endometrial hyperplasia. [de-identified] : \par Preventive visit: pt says she already received the flu vaccine this season; she sees her GYN for breast/PAP/pelvic exams; she is up to date with her colonoscopy; she does not smoke cigarettes and does not misuse alcohol; she feels safe at home and has smoke/CO detectors in the house; she wears seatbelts when in vehicles\par \par Medical Issue 1: Hypertension: pt says she is taking her medication daily and denies side effects; she has no headaches, CP, SOB and palpitations; she denies vision changes; she needs refill for her medication\par \par Medical Issue 2: Obesity: pt says she is struggling to lose weight; she is trying to exercise regularly and watch her diet but finds this to be challenging; she is asking for help with resources to lose weight\par \par Medical Issue 3: Breast cancer: pt says she is following up with her breast surgeon; she underwent complete mastectomy with reconstructive surgery and feels well; she has intermittent chest wall pains over her surgical scars but no redness, warmth or swelling that she has noticed\par \par Medical Issue 4: Endometrial hyperplasia: pt says she followed up with her GYN and had a D&C procedure done which was negative for cancerous cells; she is following up with a repeat PAP in 6 months from her procedure

## 2019-03-05 ENCOUNTER — APPOINTMENT (OUTPATIENT)
Dept: OBGYN | Facility: CLINIC | Age: 69
End: 2019-03-05
Payer: COMMERCIAL

## 2019-03-05 VITALS
DIASTOLIC BLOOD PRESSURE: 92 MMHG | SYSTOLIC BLOOD PRESSURE: 130 MMHG | HEIGHT: 68 IN | BODY MASS INDEX: 36.07 KG/M2 | WEIGHT: 238 LBS

## 2019-03-05 DIAGNOSIS — N85.00 ENDOMETRIAL HYPERPLASIA, UNSPECIFIED: ICD-10-CM

## 2019-03-05 DIAGNOSIS — Z01.818 ENCOUNTER FOR OTHER PREPROCEDURAL EXAMINATION: ICD-10-CM

## 2019-03-05 PROCEDURE — 99212 OFFICE O/P EST SF 10 MIN: CPT

## 2019-03-05 NOTE — CHIEF COMPLAINT
[Follow Up] : follow up GYN visit [FreeTextEntry1] : Patient is s/p cone biopsy in September for a pap with HGSIL and post menopausal bleeding\par Cone biopsy had LGSIL and negative ECC\par D &C showed endometrial polyp \par She is here for follow up\par No post menopausal bleeding

## 2019-03-08 LAB
25(OH)D3 SERPL-MCNC: 20.6 NG/ML
ALBUMIN SERPL ELPH-MCNC: 4.3 G/DL
ALP BLD-CCNC: 64 U/L
ALT SERPL-CCNC: 9 U/L
ANION GAP SERPL CALC-SCNC: 22 MMOL/L
AST SERPL-CCNC: 13 U/L
BASOPHILS # BLD AUTO: 0.04 K/UL
BASOPHILS NFR BLD AUTO: 0.7 %
BILIRUB SERPL-MCNC: 0.2 MG/DL
BUN SERPL-MCNC: 12 MG/DL
CALCIUM SERPL-MCNC: 9.5 MG/DL
CHLORIDE SERPL-SCNC: 103 MMOL/L
CHOLEST SERPL-MCNC: 190 MG/DL
CHOLEST/HDLC SERPL: 2.4 RATIO
CO2 SERPL-SCNC: 21 MMOL/L
CREAT SERPL-MCNC: 0.81 MG/DL
EOSINOPHIL # BLD AUTO: 0.08 K/UL
EOSINOPHIL NFR BLD AUTO: 1.3 %
GLUCOSE SERPL-MCNC: 90 MG/DL
HBA1C MFR BLD HPLC: 5.8 %
HCT VFR BLD CALC: 44.2 %
HDLC SERPL-MCNC: 81 MG/DL
HGB BLD-MCNC: 13.3 G/DL
IMM GRANULOCYTES NFR BLD AUTO: 0.2 %
LDLC SERPL CALC-MCNC: 91 MG/DL
LYMPHOCYTES # BLD AUTO: 2.23 K/UL
LYMPHOCYTES NFR BLD AUTO: 36.4 %
MAN DIFF?: NORMAL
MCHC RBC-ENTMCNC: 27 PG
MCHC RBC-ENTMCNC: 30.1 GM/DL
MCV RBC AUTO: 89.8 FL
MONOCYTES # BLD AUTO: 0.43 K/UL
MONOCYTES NFR BLD AUTO: 7 %
NEUTROPHILS # BLD AUTO: 3.34 K/UL
NEUTROPHILS NFR BLD AUTO: 54.4 %
PLATELET # BLD AUTO: 300 K/UL
POTASSIUM SERPL-SCNC: 4 MMOL/L
PROT SERPL-MCNC: 7.5 G/DL
RBC # BLD: 4.92 M/UL
RBC # FLD: 15 %
SODIUM SERPL-SCNC: 146 MMOL/L
TRIGL SERPL-MCNC: 91 MG/DL
TSH SERPL-ACNC: 1.31 UIU/ML
WBC # FLD AUTO: 6.13 K/UL

## 2019-03-12 LAB — CYTOLOGY CVX/VAG DOC THIN PREP: NORMAL

## 2019-07-01 ENCOUNTER — RX RENEWAL (OUTPATIENT)
Age: 69
End: 2019-07-01

## 2019-08-21 ENCOUNTER — MEDICATION RENEWAL (OUTPATIENT)
Age: 69
End: 2019-08-21

## 2019-09-06 ENCOUNTER — APPOINTMENT (OUTPATIENT)
Dept: INTERNAL MEDICINE | Facility: CLINIC | Age: 69
End: 2019-09-06
Payer: COMMERCIAL

## 2019-09-06 VITALS
OXYGEN SATURATION: 98 % | BODY MASS INDEX: 35.31 KG/M2 | HEART RATE: 77 BPM | DIASTOLIC BLOOD PRESSURE: 68 MMHG | SYSTOLIC BLOOD PRESSURE: 122 MMHG | TEMPERATURE: 97.9 F | HEIGHT: 68 IN | WEIGHT: 233 LBS

## 2019-09-06 PROCEDURE — G0447 BEHAVIOR COUNSEL OBESITY 15M: CPT

## 2019-09-06 PROCEDURE — 99214 OFFICE O/P EST MOD 30 MIN: CPT

## 2019-09-06 NOTE — ASSESSMENT
[FreeTextEntry1] : \par Medical Issue 1: Hypertension: BP stable, refilled medication Valsartan-HCTZ; check renal panel today\par \par Medical Issue 2: Obesity counselling: 15 mins, assessed BMI at 30 and above now in obese range; advised weight loss, exercise routine and diet; pt agreed to start exercise program for target weight loss 20 lbs; assisted patient with resources including nutrition referral as needed and arranged for follow-up to monitor weight loss progress over next several months\par \par Medical Issue 3: Breast cancer: coordinated follow-up care with her breast surgeon and oncologist

## 2019-09-06 NOTE — HISTORY OF PRESENT ILLNESS
[FreeTextEntry1] : Presents for concerns regarding hypertension, obesity, and breast cancer. [de-identified] : \par Medical Issue 1: Hypertension: pt says she is taking her medication daily and denies side effects; she has no headaches, CP, SOB and palpitations; she denies vision changes; she needs refill for her medication\par \par Medical Issue 2: Obesity: pt says she is struggling to lose weight; she is trying to exercise regularly and watch her diet but finds this to be challenging; she is asking for help with resources to lose weight\par \par Medical Issue 3: Breast cancer: pt says she is following up with her breast surgeon; she underwent complete mastectomy with reconstructive surgery and feels well; she has intermittent chest wall pains over her surgical scars but no redness, warmth or swelling that she has noticed

## 2019-09-17 NOTE — H&P PST ADULT - PMH
Pt amenable to [facility]  Once updated notes are written, they will be faxed there and then auth submitted for  Bilateral malignant neoplasm of breast in female, unspecified site of breast  1996 -  Left, 2015  - Right  Chest pain, non-cardiac    Essential hypertension    HGSIL (high grade squamous intraepithelial lesion) on Pap smear of cervix    Hyperlipidemia, unspecified hyperlipidemia type    Obesity

## 2020-01-01 NOTE — PHYSICAL EXAM

## 2020-02-18 NOTE — H&P CARDIOLOGY - SOURCE
Patient pt left without signing dc; spoke with md prior to dc and verbalized understanding of dc instruction/DC instructions

## 2020-03-06 ENCOUNTER — APPOINTMENT (OUTPATIENT)
Dept: INTERNAL MEDICINE | Facility: CLINIC | Age: 70
End: 2020-03-06

## 2020-06-08 ENCOUNTER — APPOINTMENT (OUTPATIENT)
Dept: OBGYN | Facility: CLINIC | Age: 70
End: 2020-06-08
Payer: COMMERCIAL

## 2020-06-08 VITALS
WEIGHT: 233 LBS | DIASTOLIC BLOOD PRESSURE: 80 MMHG | SYSTOLIC BLOOD PRESSURE: 140 MMHG | HEIGHT: 68 IN | BODY MASS INDEX: 35.31 KG/M2

## 2020-06-08 DIAGNOSIS — Z01.419 ENCOUNTER FOR GYNECOLOGICAL EXAMINATION (GENERAL) (ROUTINE) W/OUT ABNORMAL FINDINGS: ICD-10-CM

## 2020-06-08 DIAGNOSIS — R87.613 HIGH GRADE SQUAMOUS INTRAEPITHELIAL LESION ON CYTOLOGIC SMEAR OF CERVIX (HGSIL): ICD-10-CM

## 2020-06-08 PROCEDURE — 99397 PER PM REEVAL EST PAT 65+ YR: CPT

## 2020-06-08 NOTE — HISTORY OF PRESENT ILLNESS
[Last Colonoscopy ___] : Last colonoscopy [unfilled] [Last Mammogram ___] : Last Mammogram was [unfilled] [Last Pap ___] : Last cervical pap smear was [unfilled]

## 2020-06-08 NOTE — CHIEF COMPLAINT
[Annual Visit] : annual visit [FreeTextEntry1] : Patient is s/p cone biopsy in September 2018  for a pap with HGSIL and post menopausal bleeding\par Cone biopsy had LGSIL and negative ECC\par D &C showed endometrial polyp \par She is here for follow up\par No post menopausal bleeding

## 2020-06-08 NOTE — PHYSICAL EXAM
[Alert] : alert [Acute Distress] : no acute distress [Awake] : awake [Mass] : no breast mass [Nipple Discharge] : no nipple discharge [Right Breast Absent] : a total mastectomy [Axillary LAD] : no axillary lymphadenopathy [Left Breast Absent] : a total mastectomy [Breast Reconstruction Right] : breast reconstruction [Breast Reconstruction Left] : breast reconstruction [Tender] : non tender [Soft] : soft [Oriented x3] : oriented to person, place, and time [Normal] : uterus [Uterine Adnexae] : were not tender and not enlarged [No Bleeding] : there was no active vaginal bleeding

## 2020-06-11 LAB — CYTOLOGY CVX/VAG DOC THIN PREP: NORMAL

## 2020-07-22 ENCOUNTER — RX RENEWAL (OUTPATIENT)
Age: 70
End: 2020-07-22

## 2020-07-30 ENCOUNTER — APPOINTMENT (OUTPATIENT)
Dept: OPHTHALMOLOGY | Facility: CLINIC | Age: 70
End: 2020-07-30
Payer: COMMERCIAL

## 2020-07-30 ENCOUNTER — NON-APPOINTMENT (OUTPATIENT)
Age: 70
End: 2020-07-30

## 2020-07-30 PROCEDURE — 92133 CPTRZD OPH DX IMG PST SGM ON: CPT

## 2020-07-30 PROCEDURE — 92004 COMPRE OPH EXAM NEW PT 1/>: CPT

## 2020-08-17 ENCOUNTER — APPOINTMENT (OUTPATIENT)
Dept: INTERNAL MEDICINE | Facility: CLINIC | Age: 70
End: 2020-08-17
Payer: COMMERCIAL

## 2020-08-17 VITALS
WEIGHT: 230 LBS | BODY MASS INDEX: 34.86 KG/M2 | DIASTOLIC BLOOD PRESSURE: 85 MMHG | TEMPERATURE: 96.6 F | HEART RATE: 70 BPM | HEIGHT: 68 IN | OXYGEN SATURATION: 96 % | SYSTOLIC BLOOD PRESSURE: 148 MMHG

## 2020-08-17 PROCEDURE — G0447 BEHAVIOR COUNSEL OBESITY 15M: CPT

## 2020-08-17 PROCEDURE — G0442 ANNUAL ALCOHOL SCREEN 15 MIN: CPT

## 2020-08-17 PROCEDURE — 99213 OFFICE O/P EST LOW 20 MIN: CPT | Mod: 25

## 2020-08-17 PROCEDURE — 99397 PER PM REEVAL EST PAT 65+ YR: CPT

## 2020-08-17 NOTE — PHYSICAL EXAM
[No Acute Distress] : no acute distress [Well Nourished] : well nourished [Well Developed] : well developed [Normal Sclera/Conjunctiva] : normal sclera/conjunctiva [Well-Appearing] : well-appearing [PERRL] : pupils equal round and reactive to light [EOMI] : extraocular movements intact [Normal Outer Ear/Nose] : the outer ears and nose were normal in appearance [Normal Oropharynx] : the oropharynx was normal [No JVD] : no jugular venous distention [Supple] : supple [No Lymphadenopathy] : no lymphadenopathy [Thyroid Normal, No Nodules] : the thyroid was normal and there were no nodules present [No Respiratory Distress] : no respiratory distress  [Clear to Auscultation] : lungs were clear to auscultation bilaterally [No Accessory Muscle Use] : no accessory muscle use [Normal Rate] : normal rate  [Regular Rhythm] : with a regular rhythm [Normal S1, S2] : normal S1 and S2 [No Murmur] : no murmur heard [No Carotid Bruits] : no carotid bruits [No Abdominal Bruit] : a ~M bruit was not heard ~T in the abdomen [No Varicosities] : no varicosities [Pedal Pulses Present] : the pedal pulses are present [No Edema] : there was no peripheral edema [No Extremity Clubbing/Cyanosis] : no extremity clubbing/cyanosis [No Palpable Aorta] : no palpable aorta [Normal Appearance] : normal in appearance [No Nipple Discharge] : no nipple discharge [No Axillary Lymphadenopathy] : no axillary lymphadenopathy [Soft] : abdomen soft [Non Tender] : non-tender [Non-distended] : non-distended [No Masses] : no abdominal mass palpated [No HSM] : no HSM [Normal Bowel Sounds] : normal bowel sounds [Normal Posterior Cervical Nodes] : no posterior cervical lymphadenopathy [Normal Anterior Cervical Nodes] : no anterior cervical lymphadenopathy [No CVA Tenderness] : no CVA  tenderness [No Spinal Tenderness] : no spinal tenderness [No Joint Swelling] : no joint swelling [Grossly Normal Strength/Tone] : grossly normal strength/tone [No Rash] : no rash [Normal Gait] : normal gait [Coordination Grossly Intact] : coordination grossly intact [No Focal Deficits] : no focal deficits [Deep Tendon Reflexes (DTR)] : deep tendon reflexes were 2+ and symmetric [Normal Affect] : the affect was normal [Normal Insight/Judgement] : insight and judgment were intact [de-identified] : scars noted without erythema and swelling

## 2020-08-17 NOTE — ASSESSMENT
[FreeTextEntry1] : \par Preventive visit: pt is up to date with vaccines; she sees her GYN for breast/PAP/pelvic exams; she is up to date with her colonoscopy; praised pt's tobacco-free lifestyle; encouraged use of smoke/CO detectors in the house and use of seatbelts when in vehicles\par \par Medical Issue 1: Hypertension: BP stable, refilled medication Valsartan-HCTZ; check renal panel today\par \par Medical Issue 2: Obesity counselling: 15 mins, assessed BMI at 30 and above now in obese range; advised weight loss, exercise routine and diet; pt agreed to start exercise program for target weight loss 20 lbs; assisted patient with resources including nutrition referral as needed and arranged for follow-up to monitor weight loss progress over next several months\par \par Medical Issue 3: Breast cancer: coordinated follow-up care with her breast surgeon and oncologist\par \par Annual alcohol misuse screen, 15 mins, done; negative

## 2020-08-17 NOTE — HEALTH RISK ASSESSMENT
[Good] : ~his/her~  mood as  good [] : No [No falls in past year] : Patient reported no falls in the past year [0] : 2) Feeling down, depressed, or hopeless: Not at all (0) [de-identified] : GYN [[id number]] : 0 [Change in mental status noted] : No change in mental status noted [Language] : denies difficulty with language [Behavior] : denies difficulty with behavior [Learning/Retaining New Information] : denies difficulty learning/retaining new information [Reasoning] : denies difficulty with reasoning [Handling Complex Tasks] : denies difficulty handling complex tasks [Spatial Ability and Orientation] : denies difficulty with spatial ability and orientation [Alone] : lives alone [None] : None [Employed] : employed [Sexually Active] : not sexually active [High Risk Behavior] : no high risk behavior [Feels Safe at Home] : Feels safe at home [Fully functional (bathing, dressing, toileting, transferring, walking, feeding)] : Fully functional (bathing, dressing, toileting, transferring, walking, feeding) [Fully functional (using the telephone, shopping, preparing meals, housekeeping, doing laundry, using] : Fully functional and needs no help or supervision to perform IADLs (using the telephone, shopping, preparing meals, housekeeping, doing laundry, using transportation, managing medications and managing finances) [Reports changes in hearing] : Reports no changes in hearing [Reports changes in vision] : Reports no changes in vision [Reports normal functional visual acuity (ie: able to read med bottle)] : Reports normal functional visual acuity [Reports changes in dental health] : Reports no changes in dental health [Smoke Detector] : smoke detector [Carbon Monoxide Detector] : carbon monoxide detector [Guns at Home] : no guns at home [Safety elements used in home] : safety elements used in home [Seat Belt] :  uses seat belt [Sunscreen] : uses sunscreen [Travel to Developing Areas] : does not  travel to developing areas [TB Exposure] : is not being exposed to tuberculosis [Caregiver Concerns] : does not have caregiver concerns [Reviewed no changes] : Reviewed no changes [AdvancecareDate] : 8/20

## 2020-08-17 NOTE — HISTORY OF PRESENT ILLNESS
[FreeTextEntry1] : Presents for preventive visit as well as concerns regarding hypertension, obesity and breast cancer. [de-identified] : \par Preventive visit: pt is up to date with vaccines; she sees her GYN for breast/PAP/pelvic exams; she is up to date with her colonoscopy; she does not smoke cigarettes and does not misuse alcohol; she feels safe at home and has smoke/CO detectors in the house; she wears seatbelts when in vehicles\par \par Medical Issue 1: Hypertension: pt says she is taking her medication daily and denies side effects; she has no headaches, CP, SOB and palpitations; she denies vision changes; she needs refill for her medication\par \par Medical Issue 2: Obesity: pt says she is struggling to lose weight; she is trying to exercise regularly and watch her diet but finds this to be challenging; she is asking for help with resources to lose weight\par \par Medical Issue 3: Breast cancer: pt says she is following up with her breast surgeon; she underwent complete mastectomy with reconstructive surgery and feels well; she has intermittent chest wall pains over her surgical scars but no redness, warmth or swelling that she has noticed

## 2020-08-21 LAB
25(OH)D3 SERPL-MCNC: 48.3 NG/ML
ALBUMIN SERPL ELPH-MCNC: 4.3 G/DL
ALP BLD-CCNC: 61 U/L
ALT SERPL-CCNC: 8 U/L
ANION GAP SERPL CALC-SCNC: 13 MMOL/L
AST SERPL-CCNC: 12 U/L
BASOPHILS # BLD AUTO: 0.05 K/UL
BASOPHILS NFR BLD AUTO: 0.7 %
BILIRUB SERPL-MCNC: 0.3 MG/DL
BUN SERPL-MCNC: 20 MG/DL
CALCIUM SERPL-MCNC: 9.6 MG/DL
CHLORIDE SERPL-SCNC: 103 MMOL/L
CHOLEST SERPL-MCNC: 174 MG/DL
CHOLEST/HDLC SERPL: 2.4 RATIO
CO2 SERPL-SCNC: 24 MMOL/L
CREAT SERPL-MCNC: 0.89 MG/DL
EOSINOPHIL # BLD AUTO: 0.16 K/UL
EOSINOPHIL NFR BLD AUTO: 2.1 %
ESTIMATED AVERAGE GLUCOSE: 114 MG/DL
GLUCOSE SERPL-MCNC: 94 MG/DL
HBA1C MFR BLD HPLC: 5.6 %
HCT VFR BLD CALC: 45 %
HDLC SERPL-MCNC: 71 MG/DL
HGB BLD-MCNC: 13.5 G/DL
IMM GRANULOCYTES NFR BLD AUTO: 0.3 %
LDLC SERPL CALC-MCNC: 89 MG/DL
LYMPHOCYTES # BLD AUTO: 3.1 K/UL
LYMPHOCYTES NFR BLD AUTO: 41.4 %
MAN DIFF?: NORMAL
MCHC RBC-ENTMCNC: 27.6 PG
MCHC RBC-ENTMCNC: 30 GM/DL
MCV RBC AUTO: 91.8 FL
MONOCYTES # BLD AUTO: 0.52 K/UL
MONOCYTES NFR BLD AUTO: 6.9 %
NEUTROPHILS # BLD AUTO: 3.64 K/UL
NEUTROPHILS NFR BLD AUTO: 48.6 %
PLATELET # BLD AUTO: 252 K/UL
POTASSIUM SERPL-SCNC: 4.4 MMOL/L
PROT SERPL-MCNC: 7.2 G/DL
RBC # BLD: 4.9 M/UL
RBC # FLD: 15 %
SARS-COV-2 IGG SERPL IA-ACNC: 3.41 INDEX
SARS-COV-2 IGG SERPL QL IA: POSITIVE
SODIUM SERPL-SCNC: 140 MMOL/L
TRIGL SERPL-MCNC: 65 MG/DL
TSH SERPL-ACNC: 1.48 UIU/ML
WBC # FLD AUTO: 7.49 K/UL

## 2020-12-11 ENCOUNTER — NON-APPOINTMENT (OUTPATIENT)
Age: 70
End: 2020-12-11

## 2020-12-19 ENCOUNTER — OUTPATIENT (OUTPATIENT)
Dept: OUTPATIENT SERVICES | Facility: HOSPITAL | Age: 70
LOS: 1 days | End: 2020-12-19
Payer: COMMERCIAL

## 2020-12-19 ENCOUNTER — APPOINTMENT (OUTPATIENT)
Dept: ULTRASOUND IMAGING | Facility: CLINIC | Age: 70
End: 2020-12-19
Payer: COMMERCIAL

## 2020-12-19 DIAGNOSIS — Z90.13 ACQUIRED ABSENCE OF BILATERAL BREASTS AND NIPPLES: Chronic | ICD-10-CM

## 2020-12-19 DIAGNOSIS — Z98.890 OTHER SPECIFIED POSTPROCEDURAL STATES: Chronic | ICD-10-CM

## 2020-12-19 DIAGNOSIS — Z00.8 ENCOUNTER FOR OTHER GENERAL EXAMINATION: ICD-10-CM

## 2020-12-19 PROCEDURE — 76830 TRANSVAGINAL US NON-OB: CPT | Mod: 26

## 2020-12-19 PROCEDURE — 76830 TRANSVAGINAL US NON-OB: CPT

## 2020-12-23 PROBLEM — Z01.419 ENCOUNTER FOR GYNECOLOGICAL EXAMINATION: Status: RESOLVED | Noted: 2020-06-08 | Resolved: 2020-12-23

## 2020-12-24 ENCOUNTER — APPOINTMENT (OUTPATIENT)
Dept: OPHTHALMOLOGY | Facility: CLINIC | Age: 70
End: 2020-12-24
Payer: COMMERCIAL

## 2020-12-24 ENCOUNTER — NON-APPOINTMENT (OUTPATIENT)
Age: 70
End: 2020-12-24

## 2020-12-24 PROCEDURE — 99072 ADDL SUPL MATRL&STAF TM PHE: CPT

## 2020-12-24 PROCEDURE — 92012 INTRM OPH EXAM EST PATIENT: CPT

## 2020-12-24 PROCEDURE — 92083 EXTENDED VISUAL FIELD XM: CPT

## 2021-01-06 ENCOUNTER — APPOINTMENT (OUTPATIENT)
Dept: OBGYN | Facility: CLINIC | Age: 71
End: 2021-01-06
Payer: COMMERCIAL

## 2021-01-06 VITALS
TEMPERATURE: 96.9 F | SYSTOLIC BLOOD PRESSURE: 128 MMHG | HEIGHT: 68 IN | BODY MASS INDEX: 34.86 KG/M2 | DIASTOLIC BLOOD PRESSURE: 80 MMHG | WEIGHT: 230 LBS

## 2021-01-06 PROCEDURE — 58100 BIOPSY OF UTERUS LINING: CPT

## 2021-01-06 PROCEDURE — 99072 ADDL SUPL MATRL&STAF TM PHE: CPT

## 2021-01-06 NOTE — PROCEDURE
[Endometrial Biopsy] : Endometrial biopsy [Time out performed] : Pre-procedure time out performed.  Patient's name, date of birth and procedure confirmed. [Consent Obtained] : Consent obtained [Post-Menop. Bleeding] : post-menopausal bleeding [Risks] : risks [Benefits] : benefits [Alternatives] : alternatives [Patient] : patient [Infection] : infection [Bleeding] : bleeding [Allergic Reaction] : allergic reaction [Uterine Perforation] : uterine perforation [Pain] : pain [Negative] : negative pregnancy test [Betadine] : Betadine [None] : none [Tenaculum] : Tenaculum [Easy Passage] : Easy passage [Sounded to ___ cm] : sounded to [unfilled] ~Ucm [Anteverted] : anteverted [Scant] : scant [Sent to Pathology] : placed in buffered formalin and sent for pathology [Tolerated Well] : Patient tolerated the procedure well [No Complications] : No complications

## 2021-01-21 ENCOUNTER — NON-APPOINTMENT (OUTPATIENT)
Age: 71
End: 2021-01-21

## 2021-01-21 LAB — CORE LAB BIOPSY: NORMAL

## 2021-02-24 ENCOUNTER — OUTPATIENT (OUTPATIENT)
Dept: OUTPATIENT SERVICES | Facility: HOSPITAL | Age: 71
LOS: 1 days | End: 2021-02-24
Payer: COMMERCIAL

## 2021-02-24 ENCOUNTER — NON-APPOINTMENT (OUTPATIENT)
Age: 71
End: 2021-02-24

## 2021-02-24 ENCOUNTER — APPOINTMENT (OUTPATIENT)
Dept: ULTRASOUND IMAGING | Facility: CLINIC | Age: 71
End: 2021-02-24
Payer: COMMERCIAL

## 2021-02-24 DIAGNOSIS — Z90.13 ACQUIRED ABSENCE OF BILATERAL BREASTS AND NIPPLES: Chronic | ICD-10-CM

## 2021-02-24 DIAGNOSIS — Z00.8 ENCOUNTER FOR OTHER GENERAL EXAMINATION: ICD-10-CM

## 2021-02-24 DIAGNOSIS — Z98.890 OTHER SPECIFIED POSTPROCEDURAL STATES: Chronic | ICD-10-CM

## 2021-02-24 PROCEDURE — 76831 ECHO EXAM UTERUS: CPT | Mod: 26

## 2021-02-24 PROCEDURE — 58340 CATHETER FOR HYSTEROGRAPHY: CPT

## 2021-02-24 PROCEDURE — 76831 ECHO EXAM UTERUS: CPT

## 2021-03-09 ENCOUNTER — APPOINTMENT (OUTPATIENT)
Dept: OBGYN | Facility: CLINIC | Age: 71
End: 2021-03-09
Payer: COMMERCIAL

## 2021-03-09 VITALS — SYSTOLIC BLOOD PRESSURE: 150 MMHG | DIASTOLIC BLOOD PRESSURE: 81 MMHG

## 2021-03-09 VITALS — TEMPERATURE: 97.3 F

## 2021-03-09 PROCEDURE — 99072 ADDL SUPL MATRL&STAF TM PHE: CPT

## 2021-03-09 PROCEDURE — 99213 OFFICE O/P EST LOW 20 MIN: CPT

## 2021-03-27 ENCOUNTER — TRANSCRIPTION ENCOUNTER (OUTPATIENT)
Age: 71
End: 2021-03-27

## 2021-03-30 ENCOUNTER — OUTPATIENT (OUTPATIENT)
Dept: OUTPATIENT SERVICES | Facility: HOSPITAL | Age: 71
LOS: 1 days | End: 2021-03-30
Payer: COMMERCIAL

## 2021-03-30 VITALS
HEIGHT: 68 IN | RESPIRATION RATE: 16 BRPM | DIASTOLIC BLOOD PRESSURE: 85 MMHG | TEMPERATURE: 99 F | WEIGHT: 235.01 LBS | HEART RATE: 72 BPM | OXYGEN SATURATION: 99 % | SYSTOLIC BLOOD PRESSURE: 148 MMHG

## 2021-03-30 DIAGNOSIS — Z90.13 ACQUIRED ABSENCE OF BILATERAL BREASTS AND NIPPLES: Chronic | ICD-10-CM

## 2021-03-30 DIAGNOSIS — Z98.890 OTHER SPECIFIED POSTPROCEDURAL STATES: Chronic | ICD-10-CM

## 2021-03-30 DIAGNOSIS — N84.1 POLYP OF CERVIX UTERI: ICD-10-CM

## 2021-03-30 DIAGNOSIS — Z01.818 ENCOUNTER FOR OTHER PREPROCEDURAL EXAMINATION: ICD-10-CM

## 2021-03-30 LAB
ANION GAP SERPL CALC-SCNC: 11 MMOL/L — SIGNIFICANT CHANGE UP (ref 5–17)
BUN SERPL-MCNC: 18 MG/DL — SIGNIFICANT CHANGE UP (ref 7–23)
CALCIUM SERPL-MCNC: 9.4 MG/DL — SIGNIFICANT CHANGE UP (ref 8.4–10.5)
CHLORIDE SERPL-SCNC: 104 MMOL/L — SIGNIFICANT CHANGE UP (ref 96–108)
CO2 SERPL-SCNC: 26 MMOL/L — SIGNIFICANT CHANGE UP (ref 22–31)
CREAT SERPL-MCNC: 0.93 MG/DL — SIGNIFICANT CHANGE UP (ref 0.5–1.3)
GLUCOSE SERPL-MCNC: 90 MG/DL — SIGNIFICANT CHANGE UP (ref 70–99)
HCT VFR BLD CALC: 41 % — SIGNIFICANT CHANGE UP (ref 34.5–45)
HGB BLD-MCNC: 12.6 G/DL — SIGNIFICANT CHANGE UP (ref 11.5–15.5)
MCHC RBC-ENTMCNC: 27.4 PG — SIGNIFICANT CHANGE UP (ref 27–34)
MCHC RBC-ENTMCNC: 30.7 GM/DL — LOW (ref 32–36)
MCV RBC AUTO: 89.1 FL — SIGNIFICANT CHANGE UP (ref 80–100)
NRBC # BLD: 0 /100 WBCS — SIGNIFICANT CHANGE UP (ref 0–0)
PLATELET # BLD AUTO: 255 K/UL — SIGNIFICANT CHANGE UP (ref 150–400)
POTASSIUM SERPL-MCNC: 4.1 MMOL/L — SIGNIFICANT CHANGE UP (ref 3.5–5.3)
POTASSIUM SERPL-SCNC: 4.1 MMOL/L — SIGNIFICANT CHANGE UP (ref 3.5–5.3)
RBC # BLD: 4.6 M/UL — SIGNIFICANT CHANGE UP (ref 3.8–5.2)
RBC # FLD: 14.6 % — HIGH (ref 10.3–14.5)
SODIUM SERPL-SCNC: 141 MMOL/L — SIGNIFICANT CHANGE UP (ref 135–145)
WBC # BLD: 6.54 K/UL — SIGNIFICANT CHANGE UP (ref 3.8–10.5)
WBC # FLD AUTO: 6.54 K/UL — SIGNIFICANT CHANGE UP (ref 3.8–10.5)

## 2021-03-30 PROCEDURE — 85027 COMPLETE CBC AUTOMATED: CPT

## 2021-03-30 PROCEDURE — 93005 ELECTROCARDIOGRAM TRACING: CPT

## 2021-03-30 PROCEDURE — 80048 BASIC METABOLIC PNL TOTAL CA: CPT

## 2021-03-30 PROCEDURE — G0463: CPT

## 2021-03-30 PROCEDURE — 93010 ELECTROCARDIOGRAM REPORT: CPT

## 2021-03-30 RX ORDER — LIDOCAINE HCL 20 MG/ML
0.2 VIAL (ML) INJECTION ONCE
Refills: 0 | Status: DISCONTINUED | OUTPATIENT
Start: 2021-04-13 | End: 2021-04-27

## 2021-03-30 RX ORDER — SODIUM CHLORIDE 9 MG/ML
3 INJECTION INTRAMUSCULAR; INTRAVENOUS; SUBCUTANEOUS EVERY 8 HOURS
Refills: 0 | Status: DISCONTINUED | OUTPATIENT
Start: 2021-04-13 | End: 2021-04-27

## 2021-03-30 NOTE — H&P PST ADULT - NSICDXPASTSURGICALHX_GEN_ALL_CORE_FT
PAST SURGICAL HISTORY:  H/O bilateral mastectomy with DUNCAN flap reconstruction, 2015    H/O lumpectomy - [facility]efr, with chemo/RT, 1996    History of D&C 2014 for PMB    S/P breast reconstruction with flap 2016    S/P laparoscopy 1978     PAST SURGICAL HISTORY:  H/O bilateral mastectomy with DUNCAN flap reconstruction, 2015    H/O lumpectomy - Left, with chemo/RT, 1996    History of D&C 2014 for PMB    S/P breast reconstruction with flap 2016    S/P cervical polypectomy     S/P laparoscopy 1978

## 2021-03-30 NOTE — H&P PST ADULT - HISTORY OF PRESENT ILLNESS
69 y/o female<PMH of HTN, c/o of with some spotting on and off since Jan 2020. Seen by GYN DR Zapata, hysrteroscopy was done, diagnosed with cervical polyp. Referred and Seen by Dr Boogie 2 weeks ago, scheduled fro D&C, hysteroscopy w/myosure, polypectomy on 4/13/21 with DR Boogie. Pre op testing today. 71 y/o female<PMH of HTN, c/o of with some spotting on and off since Jan 2020. Seen by GYN DR Zapata, hysrteroscopy was done in the office, diagnosed with cervical polyp. H?O uterine polyp and polypectomy several yrs ago. Referred and Seen by Dr Boogie 2 weeks ago, scheduled fro D&C, hysteroscopy w/myosure, polypectomy on 4/13/21 with DR Boogie. Pre op testing today.

## 2021-03-30 NOTE — H&P PST ADULT - NSICDXFAMILYHX_GEN_ALL_CORE_FT
FAMILY HISTORY:  No pertinent family history in first degree relatives     FAMILY HISTORY:  Father  Still living? No  FH: prostate cancer, Age at diagnosis: Age Unknown

## 2021-03-30 NOTE — H&P PST ADULT - ATTENDING COMMENTS
69yo w postmenopausal bleeding and endometrial polyp.  Admit for D&C, operative hysteroscopy, resection of endometrial polyps.  All questions answered.  Consent signed.

## 2021-03-30 NOTE — H&P PST ADULT - ASSESSMENT
69 y/o female,  scheduled fro D&C, hysteroscopy w/myosure, polypectomy on 4/13/21 with DR Boogie. Pre op testing today.  Covid test to be done on 4/10/21, has appointment with [facility].

## 2021-03-30 NOTE — H&P PST ADULT - NSICDXPROBLEM_GEN_ALL_CORE_FT
PROBLEM DIAGNOSES  Problem: Cervical polyp  Assessment and Plan:  scheduled fro D&C, hysteroscopy w/myosure, polypectomy on 4/13/21 with DR Boogie. Pre op testing today.  Pre op instructions:   Medical eval advised  Hold OTC supplements. Medications reviewed for the week and morning of surgery.  NPO after 11pm to the morning of surgery.  Covid testing information given.  Patient verbalized understanding.         PROBLEM DIAGNOSES  Problem: Cervical polyp  Assessment and Plan:  scheduled fro D&C, hysteroscopy w/myosure, polypectomy on 4/13/21 with DR Boogie. Pre op testing today., CBC, BMP and EKG done today.  Pre op instructions:   Medical eval advised  Hold OTC supplements. Medications reviewed for the week and morning of surgery.  NPO after 11pm to the morning of surgery.  Covid testing information given.  Patient verbalized understanding.

## 2021-03-30 NOTE — H&P PST ADULT - NSICDXPASTMEDICALHX_GEN_ALL_CORE_FT
PAST MEDICAL HISTORY:  Bilateral malignant neoplasm of breast in female, unspecified site of breast 1996 -  Left, 2015  - Right    Chest pain, non-cardiac     Essential hypertension     HGSIL (high grade squamous intraepithelial lesion) on Pap smear of cervix     Hyperlipidemia, unspecified hyperlipidemia type     Obesity

## 2021-04-06 ENCOUNTER — APPOINTMENT (OUTPATIENT)
Dept: INTERNAL MEDICINE | Facility: CLINIC | Age: 71
End: 2021-04-06
Payer: COMMERCIAL

## 2021-04-06 VITALS
HEIGHT: 68 IN | WEIGHT: 238 LBS | DIASTOLIC BLOOD PRESSURE: 78 MMHG | BODY MASS INDEX: 36.07 KG/M2 | TEMPERATURE: 98.1 F | SYSTOLIC BLOOD PRESSURE: 130 MMHG | HEART RATE: 94 BPM | OXYGEN SATURATION: 98 %

## 2021-04-06 PROCEDURE — 99072 ADDL SUPL MATRL&STAF TM PHE: CPT

## 2021-04-06 PROCEDURE — 99215 OFFICE O/P EST HI 40 MIN: CPT

## 2021-04-06 NOTE — ASSESSMENT
[High Risk Surgery - Intraperitoneal, Intrathoracic or Supringuinal Vascular Procedures] : High Risk Surgery - Intraperitoneal, Intrathoracic or Supringuinal Vascular Procedures - No (0) [Ischemic Heart Disease] : Ischemic Heart Disease - No (0) [Congestive Heart Failure] : Congestive Heart Failure - No (0) [Prior Cerebrovascular Accident or TIA] : Prior Cerebrovascular Accident or TIA - No (0) [Creatinine >= 2mg/dL (1 Point)] : Creatinine >= 2mg/dL - No (0) [Insulin-dependent Diabetic (1 Point)] : Insulin-dependent Diabetic - No (0) [0] : 0 , RCRI Class: I, Risk of Post-Op Cardiac Complications: 3.9%, 95% CI for Risk Estimate: 2.8% - 5.4% [No Further Testing Recommended] : no further testing recommended [FreeTextEntry4] : \par Labs and EKG reviewed. Labs all normal. T wave inversion noted on EKG in lateral lead. Patient denies any cardiac complaints. She underwent workup with cardiologist including stress testing and ECHO showing no active disease. There is currently no medical contraindication to proceed with the planned surgery at this time.

## 2021-04-06 NOTE — HISTORY OF PRESENT ILLNESS
[No Pertinent Cardiac History] : no history of aortic stenosis, atrial fibrillation, coronary artery disease, recent myocardial infarction, or implantable device/pacemaker [Aortic Stenosis] : no aortic stenosis [Atrial Fibrillation] : no atrial fibrillation [Coronary Artery Disease] : no coronary artery disease [Recent Myocardial Infarction] : no recent myocardial infarction [Implantable Device/Pacemaker] : no implantable device/pacemaker [No Pertinent Pulmonary History] : no history of asthma, COPD, sleep apnea, or smoking [Asthma] : no asthma [COPD] : no COPD [Sleep Apnea] : no sleep apnea [Smoker] : not a smoker [No Adverse Anesthesia Reaction] : no adverse anesthesia reaction in self or family member [Family Member] : no family member with adverse anesthesia reaction/sudden death [Self] : no previous adverse anesthesia reaction [Chronic Anticoagulation] : no chronic anticoagulation [Chronic Kidney Disease] : no chronic kidney disease [Diabetes] : no diabetes [(Patient denies any chest pain, claudication, dyspnea on exertion, orthopnea, palpitations or syncope)] : Patient denies any chest pain, claudication, dyspnea on exertion, orthopnea, palpitations or syncope [Excellent (>10 METs)] : Excellent (>10 METs) [FreeTextEntry1] : D&C [FreeTextEntry2] : 4/13/21 [FreeTextEntry3] : Dr. Deb Boogie

## 2021-04-11 ENCOUNTER — OUTPATIENT (OUTPATIENT)
Dept: OUTPATIENT SERVICES | Facility: HOSPITAL | Age: 71
LOS: 1 days | End: 2021-04-11
Payer: COMMERCIAL

## 2021-04-11 DIAGNOSIS — Z98.890 OTHER SPECIFIED POSTPROCEDURAL STATES: Chronic | ICD-10-CM

## 2021-04-11 DIAGNOSIS — Z11.52 ENCOUNTER FOR SCREENING FOR COVID-19: ICD-10-CM

## 2021-04-11 DIAGNOSIS — Z90.13 ACQUIRED ABSENCE OF BILATERAL BREASTS AND NIPPLES: Chronic | ICD-10-CM

## 2021-04-11 LAB — SARS-COV-2 RNA SPEC QL NAA+PROBE: SIGNIFICANT CHANGE UP

## 2021-04-11 PROCEDURE — U0005: CPT

## 2021-04-11 PROCEDURE — C9803: CPT

## 2021-04-11 PROCEDURE — U0003: CPT

## 2021-04-12 ENCOUNTER — TRANSCRIPTION ENCOUNTER (OUTPATIENT)
Age: 71
End: 2021-04-12

## 2021-04-13 ENCOUNTER — APPOINTMENT (OUTPATIENT)
Dept: OBGYN | Facility: CLINIC | Age: 71
End: 2021-04-13

## 2021-04-13 ENCOUNTER — OUTPATIENT (OUTPATIENT)
Dept: OUTPATIENT SERVICES | Facility: HOSPITAL | Age: 71
LOS: 1 days | End: 2021-04-13
Payer: COMMERCIAL

## 2021-04-13 ENCOUNTER — RESULT REVIEW (OUTPATIENT)
Age: 71
End: 2021-04-13

## 2021-04-13 VITALS
OXYGEN SATURATION: 99 % | SYSTOLIC BLOOD PRESSURE: 146 MMHG | HEART RATE: 68 BPM | TEMPERATURE: 98 F | RESPIRATION RATE: 14 BRPM | DIASTOLIC BLOOD PRESSURE: 66 MMHG

## 2021-04-13 VITALS
HEIGHT: 68 IN | RESPIRATION RATE: 14 BRPM | HEART RATE: 70 BPM | WEIGHT: 235.01 LBS | DIASTOLIC BLOOD PRESSURE: 83 MMHG | TEMPERATURE: 98 F | SYSTOLIC BLOOD PRESSURE: 158 MMHG | OXYGEN SATURATION: 100 %

## 2021-04-13 DIAGNOSIS — Z98.890 OTHER SPECIFIED POSTPROCEDURAL STATES: Chronic | ICD-10-CM

## 2021-04-13 DIAGNOSIS — Z90.13 ACQUIRED ABSENCE OF BILATERAL BREASTS AND NIPPLES: Chronic | ICD-10-CM

## 2021-04-13 DIAGNOSIS — N84.1 POLYP OF CERVIX UTERI: ICD-10-CM

## 2021-04-13 PROCEDURE — 58558 HYSTEROSCOPY BIOPSY: CPT

## 2021-04-13 PROCEDURE — 88305 TISSUE EXAM BY PATHOLOGIST: CPT

## 2021-04-13 PROCEDURE — 88305 TISSUE EXAM BY PATHOLOGIST: CPT | Mod: 26

## 2021-04-13 RX ORDER — ONDANSETRON 8 MG/1
4 TABLET, FILM COATED ORAL ONCE
Refills: 0 | Status: DISCONTINUED | OUTPATIENT
Start: 2021-04-13 | End: 2021-04-27

## 2021-04-13 RX ORDER — OXYCODONE HYDROCHLORIDE 5 MG/1
5 TABLET ORAL ONCE
Refills: 0 | Status: DISCONTINUED | OUTPATIENT
Start: 2021-04-13 | End: 2021-04-13

## 2021-04-13 RX ORDER — SODIUM CHLORIDE 9 MG/ML
1000 INJECTION, SOLUTION INTRAVENOUS
Refills: 0 | Status: DISCONTINUED | OUTPATIENT
Start: 2021-04-13 | End: 2021-04-27

## 2021-04-13 RX ORDER — HYDROMORPHONE HYDROCHLORIDE 2 MG/ML
0.25 INJECTION INTRAMUSCULAR; INTRAVENOUS; SUBCUTANEOUS
Refills: 0 | Status: DISCONTINUED | OUTPATIENT
Start: 2021-04-13 | End: 2021-04-13

## 2021-04-13 NOTE — BRIEF OPERATIVE NOTE - ELECTIVE PROCEDURE
Problem: Patient Care Overview  Goal: Plan of Care Review   06/14/18 1840   Coping/Psychosocial   Plan of Care Reviewed With patient   OTHER   Outcome Summary Pain meds given. Hard collar on at all times. Wound care given and documented today. Fluids stopped. Pain meds now q4h. Up to toilet. Possible dischargte to [facility] on Friday.    Plan of Care Review   Progress no change       Problem: Fall Risk (Adult)  Goal: Absence of Fall   06/14/18 1840   Fall Risk (Adult)   Absence of Fall making progress toward outcome       Problem: Pain, Acute (Adult)  Goal: Acceptable Pain Control/Comfort Level   06/14/18 1840   Pain, Acute (Adult)   Acceptable Pain Control/Comfort Level making progress toward outcome          Yes

## 2021-04-13 NOTE — ASU PATIENT PROFILE, ADULT - ANESTHESIA, PREVIOUS REACTION, PROFILE
Telephone Encounter by Clarke Fabian RMA at 08/05/17 09:30 AM     Author:  Clarke Fabian RMA Service:  (none) Author Type:  Medical Assistant     Filed:  08/05/17 09:30 AM Encounter Date:  8/3/2017 Status:  Signed     :  Clarke Fabian RMA (Medical Assistant)       From: Edis Newberry  To: Amari Burgos MD  Sent: 8/3/2017  6:46 AM CDT  Subject: Medication Renewal Request    Original authorizing provider: MD Edis Contreras would like a refill of the following medications:  atorvastatin (LIPITOR) 10 MG tablet [Amari Burgos MD]    Preferred pharmacy: Other - [facility] - [phone]    Comment:  call into [facility] for mail order please       Revision History        Date/Time User Provider Type Action    > 08/05/17 09:30 AM Clarke Fabian RMA Medical Assistant Sign    Attribution information within the note text is not available.            
none

## 2021-04-13 NOTE — ASU DISCHARGE PLAN (ADULT/PEDIATRIC) - CARE PROVIDER_API CALL
Deb Boogie)  Obstetrics and Gynecology  [address], [address]  [place] [address]  Phone: [phone]  Fax: [phone]  Follow Up Time:

## 2021-04-13 NOTE — ASU PATIENT PROFILE, ADULT - PSH
H/O bilateral mastectomy  with DUNCAN flap reconstruction, 2015  H/O lumpectomy  - Left, with chemo/RT, 1996  History of D&C  2014 for PMB  S/P breast reconstruction  with flap 2016  S/P cervical polypectomy    S/P laparoscopy  1978

## 2021-04-13 NOTE — ASU DISCHARGE PLAN (ADULT/PEDIATRIC) - NURSING INSTRUCTIONS
******************************************************************************************  Next dose of TYLENOL may be taken at or after _________6.30___ PM if needed. DO NOT take any additional products containing TYLENOL or ACETAMINOPHEN, such as VICODIN, PERCOCET, NORCO, EXCEDRIN, and any over-the-counter cold medications until this time. DO NOT CONSUME MORE THAN 7476-9237 MG OF TYLENOL (acetaminophen) in a 24-hour period.

## 2021-04-13 NOTE — PRE-ANESTHESIA EVALUATION ADULT - NSANTHASARD_GEN_ALL_CORE
32.2 wk infant born to a 32 y.o. , B+/GBS unknown (amp x2), all other PNL unremarkable. Med Hx: hypothyroid on synthroid. Obhx:  x2 ( and ).  IUI triplet pregnancy, presented in  labor with SROM at 0400 with clear fluid, received beta x1 () and mg.  Delivered via primary c/s due to breech on baby C.  Brought infant to warmer W/D/S/S. Spontaneous cry and good tone in room air. Transfer to NICU for further care.    Remained comfortable in RA throughout stay. S/p ampi/gent X 48 hours with negative blood cultures. CBC with differential benign.  S/P TPN/IL. Full enteral feeds on DOL #.... Feeding ad diana with good intake and stable blood glucose levels. Maintaining temperature in open crib. TFT drawn at 1 week of life due to maternal hypothyroidism. 3 32.2 wk infant born to a 32 y.o. , B+/GBS unknown (amp x2), all other PNL unremarkable. Med Hx: hypothyroid on synthroid. Obhx:  x2 ( and ).  IUI triplet pregnancy, presented in  labor with SROM at 0400 with clear fluid, received beta x1 () and mg.  Delivered via primary c/s due to breech on baby C.  Brought infant to warmer W/D/S/S. Spontaneous cry and good tone in room air. Transfer to NICU for further care.    Remained comfortable in RA throughout stay. S/P ampi/gent X 48 hours with negative blood culture from birth. CBC with differential benign.  S/P hyperbilirubinemia treated with phototherapy. S/P TPN/IL. Full enteral feeds on DOL #.... Feeding ad diana with good intake and stable blood glucose levels. Maintaining temperature in open crib. TFT drawn at 1 week of life due to maternal hypothyroidism and WNL for age. 32.2 wk infant born to a 32 y.o. , B+/GBS unknown (amp x2), all other PNL unremarkable. Med Hx: hypothyroid on synthroid. Obhx:  x2 ( and ).  IUI triplet pregnancy, presented in  labor with SROM at 0400 with clear fluid, received beta x1 () and mg.  Delivered via primary c/s due to breech on baby C.  Brought infant to warmer W/D/S/S. Spontaneous cry and good tone in room air. Transfer to NICU for further care.    Remained comfortable in RA throughout stay. S/P ampi/gent X 48 hours with negative blood culture from birth. CBC with differential benign.  S/P hyperbilirubinemia treated with phototherapy. S/P TPN/IL. Full enteral feeds on DOL #.... Feeding ad diana with good intake and stable blood glucose levels. Maintaining temperature in open crib. TFT drawn at 1 week of life due to maternal hypothyroidism and WNL for age. HUS unremarkable. 32.2 wk infant born to a 32 y.o. , B+/GBS unknown (amp x2), all other PNL unremarkable. Med Hx: hypothyroid on synthroid. Obhx:  x2 ( and ).  IUI triplet pregnancy, presented in  labor with SROM at 0400 with clear fluid, received beta x1 () and mg.  Delivered via primary c/s due to breech on baby C.  Brought infant to warmer W/D/S/S. Spontaneous cry and good tone in room air. Transfer to NICU for further care.    Remained comfortable in RA throughout stay. S/P ampi/gent X 48 hours with negative blood culture from birth. CBC with differential benign.  S/P hyperbilirubinemia treated with phototherapy. S/P TPN/IL. Full enteral feeds on DOL # 8  Feeding ad diana with good intake and stable blood glucose levels. Maintaining temperature in open crib. TFT drawn at 1 week of life due to maternal hypothyroidism and WNL for age. HUS unremarkable. 32.2 wk infant born to a 32 y.o. , B+/GBS unknown (amp x2), all other PNL unremarkable. Med Hx: hypothyroid on synthroid. Obhx:  x2 ( and ).  IUI triplet pregnancy, presented in  labor with SROM at 0400 with clear fluid, received beta x1 () and mg.  Delivered via primary c/s due to breech on baby C.  Brought infant to warmer W/D/S/S. Spontaneous cry and good tone in room air. Transfer to NICU for further care.    Remained comfortable in RA throughout stay. S/P ampi/gent X 48 hours with negative blood culture from birth. CBC with differential benign.  S/P hyperbilirubinemia treated with phototherapy. S/P TPN/IL. Full enteral feeds on DOL # 8.  Feeding ad diana with good intake and stable blood glucose levels. Maintaining temperature in open crib. TFT drawn at 1 week of life due to maternal hypothyroidism and WNL for age. HUS unremarkable.

## 2021-04-15 LAB — SURGICAL PATHOLOGY STUDY: SIGNIFICANT CHANGE UP

## 2021-04-16 ENCOUNTER — NON-APPOINTMENT (OUTPATIENT)
Age: 71
End: 2021-04-16

## 2021-04-29 ENCOUNTER — APPOINTMENT (OUTPATIENT)
Dept: GYNECOLOGIC ONCOLOGY | Facility: CLINIC | Age: 71
End: 2021-04-29
Payer: COMMERCIAL

## 2021-04-29 VITALS
BODY MASS INDEX: 36.37 KG/M2 | SYSTOLIC BLOOD PRESSURE: 161 MMHG | HEART RATE: 79 BPM | DIASTOLIC BLOOD PRESSURE: 85 MMHG | WEIGHT: 240 LBS | HEIGHT: 68 IN

## 2021-04-29 DIAGNOSIS — Z83.3 FAMILY HISTORY OF DIABETES MELLITUS: ICD-10-CM

## 2021-04-29 DIAGNOSIS — Z01.818 ENCOUNTER FOR OTHER PREPROCEDURAL EXAMINATION: ICD-10-CM

## 2021-04-29 DIAGNOSIS — Z82.49 FAMILY HISTORY OF ISCHEMIC HEART DISEASE AND OTHER DISEASES OF THE CIRCULATORY SYSTEM: ICD-10-CM

## 2021-04-29 DIAGNOSIS — Z80.42 FAMILY HISTORY OF MALIGNANT NEOPLASM OF PROSTATE: ICD-10-CM

## 2021-04-29 DIAGNOSIS — Z80.0 FAMILY HISTORY OF MALIGNANT NEOPLASM OF DIGESTIVE ORGANS: ICD-10-CM

## 2021-04-29 PROCEDURE — 99072 ADDL SUPL MATRL&STAF TM PHE: CPT

## 2021-04-29 PROCEDURE — 99204 OFFICE O/P NEW MOD 45 MIN: CPT

## 2021-04-29 RX ORDER — NAPROXEN 250 MG/1
250 TABLET ORAL
Qty: 14 | Refills: 0 | Status: DISCONTINUED | COMMUNITY
Start: 2018-08-20 | End: 2021-04-29

## 2021-04-29 NOTE — DISCUSSION/SUMMARY
[Reviewed Clinical Lab Test(s)] : Results of clinical tests were reviewed. [Reviewed Radiology Report(s)] : Radiology reports were reviewed. [Pre Op] : The differential diagnosis was discussed in detail. The indications, risks, benefits and alternatives were discussed. [unfilled] expressed an understanding of the treatment rationale and her questions were answered to her apparent satisfaction. [FreeTextEntry1] : 70 year old with newly diagnosed complex hyperplasia with atypia.\par \par I discussed treatment recommendations with Ms. AGUILERA  in detail.\par \par I am recommending hysterectomy/bilateral salpingo-oophorectomy and possible staging.  The risk of endometrial adenocarcinoma with EIN is approximately 50%.  We discussed the limitations of frozen section and well as the plan for sentinel lymph node mapping.  We plan for minimally invasive approach with robotic assisted surgery.  Risks/benefits/alternatives were discussed.  Risks include bleeding/blood transfusion/infection/injury to bowel/bladder/ureter/blood vessels/conversion to laparotomy.  Alternatives to hysterectomy include hormonal therapy, which is inferior in efficacy to hysterectomy and used in patients who desire future fertility or for whom surgery is precluded due to medical comorbidities.  Postoperative expectations and recovery were discussed in detail.\par \par She will be referred for medical clearance and surgery will be scheduled in the near future.\par \par

## 2021-04-29 NOTE — PAST MEDICAL HISTORY
[Postmenopausal] : The patient is postmenopausal [Menopause Age____] : age at menopause was [unfilled] [Total Preg ___] : G[unfilled] [Live Births ___] : P[unfilled]  [Abortions ___] : Abortions:[unfilled]

## 2021-04-29 NOTE — OB HISTORY
[Total Preg ___] : : [unfilled] [Vaginal ___] : [unfilled] vaginal delivery(s) [Definite ___ (Date)] : the last menstrual period was [unfilled] [Menopause  Age ____] : menopause occurred at age [unfilled]

## 2021-04-29 NOTE — HISTORY OF PRESENT ILLNESS
[FreeTextEntry1] : Referred by Dr. Boogie\par \par 69 yo  Donnie female (BMI 36) with PMB which began 2020.  TVUS in 2020 revealed thickened endometrium of 7mm.  Sono hyst was obtained in 2021 and revealed a broad based polyp 1.4 x 0.5cm.  D&C was performed in 21 which revealed EIN in endometrial polyp.\par \par Previously in 2018 she reported PMB.  Pelvic sonogram revealed EMS of 6.8mm and endometrial biopsy revealed disordered proliferative endometrium.  She has history of breast cancer in  s/p left lumpectomy followed by chemotherapy and RT.  Tamoxifen x 5 years completed  followed by Femara x 5 years completed . S/p total mastectomy with TRAM flap reconstruction in . She sees medical oncology every 6 months at [facility]. BRCA testing negative per patient. \par \par She is currently not bleeding and denies pelvic pain, cramping, N/V, bowel or urinary issues. \par \par PMHx: HTN\par PSHx: total bilateral mastectomy with reconstruction s/p TRAM flap\par Fam Hx: brother (colon ca), father (prostate ca)\par

## 2021-04-29 NOTE — PHYSICAL EXAM
[Fully active, able to carry on all pre-disease performance without restriction] : Status 0 - Fully active, able to carry on all pre-disease performance without restriction [Normal] : Recto-Vaginal Exam: Normal [de-identified] : Obese, overhanging pannus with transiliac scar

## 2021-05-10 ENCOUNTER — APPOINTMENT (OUTPATIENT)
Dept: OBGYN | Facility: CLINIC | Age: 71
End: 2021-05-10

## 2021-05-31 NOTE — ASU PREOP CHECKLIST - WEIGHT IN LBS
511 DVT ppx: lovenox 40 SQ  Diet: Dysphagia diet with tube feeds. Pending Registered Dietician recommendations. Patient states that she receives tube feeds and also drinks thick liquids/dysphagia while at [facility]. According to [facility]ELDMAN notes, patient received tube feeds Glucerna 1.5 for 15 hours 5pm -8am nightly. She also is on a dysphagia nectar thick diet, with 4oz to be given with supervision. Patient reiterated her understanding that she is at risk for aspiration if placed on oral diet due to her CVA history. She accepted this risk.   Dispo: pending  back to [facility] rehab, delayed due to long weekend - Hx of gout  - c/w allopurinol 300mg QD.

## 2021-06-07 ENCOUNTER — NON-APPOINTMENT (OUTPATIENT)
Age: 71
End: 2021-06-07

## 2021-06-07 ENCOUNTER — APPOINTMENT (OUTPATIENT)
Dept: INTERNAL MEDICINE | Facility: CLINIC | Age: 71
End: 2021-06-07
Payer: COMMERCIAL

## 2021-06-07 VITALS
DIASTOLIC BLOOD PRESSURE: 80 MMHG | BODY MASS INDEX: 36.37 KG/M2 | OXYGEN SATURATION: 98 % | TEMPERATURE: 98 F | HEIGHT: 68 IN | HEART RATE: 68 BPM | WEIGHT: 240 LBS | SYSTOLIC BLOOD PRESSURE: 152 MMHG

## 2021-06-07 PROCEDURE — 99215 OFFICE O/P EST HI 40 MIN: CPT

## 2021-06-07 PROCEDURE — 99072 ADDL SUPL MATRL&STAF TM PHE: CPT

## 2021-06-07 PROCEDURE — G0447 BEHAVIOR COUNSEL OBESITY 15M: CPT

## 2021-06-07 NOTE — HISTORY OF PRESENT ILLNESS
[No Pertinent Cardiac History] : no history of aortic stenosis, atrial fibrillation, coronary artery disease, recent myocardial infarction, or implantable device/pacemaker [Aortic Stenosis] : no aortic stenosis [Atrial Fibrillation] : no atrial fibrillation [Coronary Artery Disease] : no coronary artery disease [Recent Myocardial Infarction] : no recent myocardial infarction [Implantable Device/Pacemaker] : no implantable device/pacemaker [No Pertinent Pulmonary History] : no history of asthma, COPD, sleep apnea, or smoking [Asthma] : no asthma [COPD] : no COPD [Sleep Apnea] : no sleep apnea [Smoker] : not a smoker [No Adverse Anesthesia Reaction] : no adverse anesthesia reaction in self or family member [Family Member] : no family member with adverse anesthesia reaction/sudden death [Self] : no previous adverse anesthesia reaction [Chronic Anticoagulation] : no chronic anticoagulation [Chronic Kidney Disease] : no chronic kidney disease [Diabetes] : no diabetes [(Patient denies any chest pain, claudication, dyspnea on exertion, orthopnea, palpitations or syncope)] : Patient denies any chest pain, claudication, dyspnea on exertion, orthopnea, palpitations or syncope [FreeTextEntry1] : JESENIA/BSO [FreeTextEntry2] : 6/15/21 [FreeTextEntry3] : Dr. Esthela Nelson

## 2021-06-07 NOTE — ASSESSMENT
[High Risk Surgery - Intraperitoneal, Intrathoracic or Supringuinal Vascular Procedures] : High Risk Surgery - Intraperitoneal, Intrathoracic or Supringuinal Vascular Procedures - No (0) [Ischemic Heart Disease] : Ischemic Heart Disease - No (0) [Congestive Heart Failure] : Congestive Heart Failure - No (0) [Prior Cerebrovascular Accident or TIA] : Prior Cerebrovascular Accident or TIA - No (0) [Creatinine >= 2mg/dL (1 Point)] : Creatinine >= 2mg/dL - No (0) [Insulin-dependent Diabetic (1 Point)] : Insulin-dependent Diabetic - No (0) [0] : 0 , RCRI Class: I, Risk of Post-Op Cardiac Complications: 3.9%, 95% CI for Risk Estimate: 2.8% - 5.4% [Patient Optimized for Surgery] : Patient optimized for surgery [No Further Testing Recommended] : no further testing recommended [FreeTextEntry4] : \par Obesity counselling: 15 mins, assessed BMI at 30 and above now in obese range; advised weight loss, exercise routine and diet; patient agreed to start exercise program for target weight loss 20 lbs; assisted patient with resources including nutrition referral as needed and arranged for follow-up to monitor weight loss progress over next several months\par

## 2021-06-08 ENCOUNTER — OUTPATIENT (OUTPATIENT)
Dept: OUTPATIENT SERVICES | Facility: HOSPITAL | Age: 71
LOS: 1 days | End: 2021-06-08

## 2021-06-08 VITALS
HEART RATE: 67 BPM | RESPIRATION RATE: 16 BRPM | DIASTOLIC BLOOD PRESSURE: 78 MMHG | WEIGHT: 235.89 LBS | OXYGEN SATURATION: 98 % | SYSTOLIC BLOOD PRESSURE: 140 MMHG | HEIGHT: 67 IN | TEMPERATURE: 97 F

## 2021-06-08 DIAGNOSIS — Z20.822 CONTACT WITH AND (SUSPECTED) EXPOSURE TO COVID-19: ICD-10-CM

## 2021-06-08 DIAGNOSIS — Z90.13 ACQUIRED ABSENCE OF BILATERAL BREASTS AND NIPPLES: Chronic | ICD-10-CM

## 2021-06-08 DIAGNOSIS — N85.02 ENDOMETRIAL INTRAEPITHELIAL NEOPLASIA [EIN]: ICD-10-CM

## 2021-06-08 DIAGNOSIS — Z98.890 OTHER SPECIFIED POSTPROCEDURAL STATES: Chronic | ICD-10-CM

## 2021-06-08 DIAGNOSIS — I10 ESSENTIAL (PRIMARY) HYPERTENSION: ICD-10-CM

## 2021-06-08 DIAGNOSIS — E66.9 OBESITY, UNSPECIFIED: ICD-10-CM

## 2021-06-08 LAB
A1C WITH ESTIMATED AVERAGE GLUCOSE RESULT: 5.6 % — SIGNIFICANT CHANGE UP (ref 4–5.6)
BLD GP AB SCN SERPL QL: NEGATIVE — SIGNIFICANT CHANGE UP
ESTIMATED AVERAGE GLUCOSE: 114 MG/DL — SIGNIFICANT CHANGE UP (ref 68–114)
RH IG SCN BLD-IMP: POSITIVE — SIGNIFICANT CHANGE UP

## 2021-06-08 RX ORDER — ATORVASTATIN CALCIUM 80 MG/1
1 TABLET, FILM COATED ORAL
Qty: 0 | Refills: 0 | DISCHARGE

## 2021-06-08 RX ORDER — METOPROLOL TARTRATE 50 MG
1 TABLET ORAL
Qty: 0 | Refills: 0 | DISCHARGE

## 2021-06-08 RX ORDER — CHOLECALCIFEROL (VITAMIN D3) 125 MCG
0 CAPSULE ORAL
Qty: 0 | Refills: 0 | DISCHARGE

## 2021-06-08 RX ORDER — ACETAMINOPHEN 500 MG
2 TABLET ORAL
Qty: 0 | Refills: 0 | DISCHARGE

## 2021-06-08 RX ORDER — IBUPROFEN 200 MG
1 TABLET ORAL
Qty: 0 | Refills: 0 | DISCHARGE

## 2021-06-08 RX ORDER — ASPIRIN/CALCIUM CARB/MAGNESIUM 324 MG
1 TABLET ORAL
Qty: 0 | Refills: 0 | DISCHARGE

## 2021-06-08 NOTE — H&P PST ADULT - NEGATIVE ENMT SYMPTOMS
no hearing difficulty/no tinnitus/no vertigo/no sinus symptoms/no nose bleeds/no gum bleeding/no throat pain/no dysphagia

## 2021-06-08 NOTE — H&P PST ADULT - NSICDXPROBLEM_GEN_ALL_CORE_FT
PROBLEM DIAGNOSES  Problem: Endometrial intraepithelial neoplasia (EIN)  Assessment and Plan: Patient scheduled for robotic assisted total laparoscopic hysterectomy, bateral salpingo oophorectomy, sentinel lymph node mapping and excision on 6/15/2021  Written & verbal preop instructions, gi prophylaxis & surgical soap given  Pt verbalized good understanding.  Teach back done on surgical soap instructions.   Patient had preop Medical evaluation with PCP, pending copy of report.     Problem: Hypertension  Assessment and Plan: Patient instructed to take Valsartan on AM of surgery with small sip of water     Problem: Encounter for screening laboratory testing for COVID-19 virus  Assessment and Plan: Patient aware of need for COVID testing prior to procedure and advised to co ordinate with surgeon.        PROBLEM DIAGNOSES  Problem: Endometrial intraepithelial neoplasia (EIN)  Assessment and Plan: Patient scheduled for robotic assisted total laparoscopic hysterectomy, bateral salpingo oophorectomy, sentinel lymph node mapping and excision on 6/15/2021  Written & verbal preop instructions, gi prophylaxis & surgical soap given  Pt verbalized good understanding.  Teach back done on surgical soap instructions.   Patient had preop Medical evaluation with PCP, pending copy of report.     Problem: Hypertension  Assessment and Plan: Patient instructed to take Valsartan on AM of surgery with small sip of water, copy of most recent Echo, Stress test and Cardiac Cath report in chart    Problem: Encounter for screening laboratory testing for COVID-19 virus  Assessment and Plan: Patient aware of need for COVID testing prior to procedure and advised to co ordinate with surgeon.        PROBLEM DIAGNOSES  Problem: Endometrial intraepithelial neoplasia (EIN)  Assessment and Plan: Patient scheduled for robotic assisted total laparoscopic hysterectomy, bateral salpingo oophorectomy, sentinel lymph node mapping and excision on 6/15/2021  Written & verbal preop instructions, gi prophylaxis & surgical soap given  Pt verbalized good understanding.  Teach back done on surgical soap instructions.   Patient had preop Medical evaluation with PCP, pending copy of report.     Problem: Hypertension  Assessment and Plan: Patient instructed to take Valsartan on AM of surgery with small sip of water, copy of most recent Echo, Stress test and Cardiac Cath report in chart    Problem: Encounter for screening laboratory testing for COVID-19 virus  Assessment and Plan: Patient aware of need for COVID testing prior to procedure and advised to co ordinate with surgeon.     Problem: Class 2 obesity with body mass index (BMI) of 36.0 to 36.9 in adult  Assessment and Plan: DESHAWN precaution, OR booking notified

## 2021-06-08 NOTE — H&P PST ADULT - NSANTHAGERD_ENT_A_CORE
As her COVID-19 test is negative, she can discontinue quarantine  Message sent to patient via [facility] [employer] patient portal   Nurse to also call patient 
Yes

## 2021-06-08 NOTE — H&P PST ADULT - NSICDXPASTSURGICALHX_GEN_ALL_CORE_FT
PAST SURGICAL HISTORY:  H/O bilateral mastectomy with DUNCAN flap reconstruction, 2015    H/O lumpectomy - Left, with chemo/RT, 1996    History of D&C 2014 for PMB    S/P breast reconstruction with flap 2016    S/P cervical polypectomy     S/P laparoscopy 1978

## 2021-06-08 NOTE — H&P PST ADULT - NSICDXPASTMEDICALHX_GEN_ALL_CORE_FT
PAST MEDICAL HISTORY:  Bilateral malignant neoplasm of breast in female, unspecified site of breast 1996 -  Left, 2015  - Right    Chest pain, non-cardiac     Endometrial intraepithelial neoplasia (EIN)     Essential hypertension     HGSIL (high grade squamous intraepithelial lesion) on Pap smear of cervix     Hyperlipidemia, unspecified hyperlipidemia type     Obesity

## 2021-06-08 NOTE — H&P PST ADULT - HISTORY OF PRESENT ILLNESS
71 yo female presents to [facility] for preop evaluation for robotic assisted total laparoscopic hysterectomy, bateral salpingo oophorectomy, sentinel lymph node mapping and excision.  Patient reports history of abnormal vaginal bleeding s/p D&C hysteroscopy with myosure polypectomy on 4/13/2021.  Patient diagnosed with endometrial intraepithelial neoplasia.  Patient denies current vaginal bleeding, spotting or discharge.

## 2021-06-08 NOTE — H&P PST ADULT - NEGATIVE BREAST SYMPTOMS
Reason for Admission:   Discitis of thoracic region RRAT Score:     7 Plan for utilizing home health: To be determined Likelihood of Readmission:  low Transition of Care Plan:  Discharge plan is for home vs home health. Met with pt and discussed discharge plan. Pt lives in a 1 story home with mother and sister, Suzanne Webb. Prior to admission, pt was independent with ADL's and able to drive self to MD appointments. Owns no DME. No prior use of HH or rehab services. Pt stated \" I want to wait until the doctor do this biopsy before I make any decisions. \" Per pt, sister will transport home at discharge. CM will continue to monitor for transitional needs. Care Management Interventions PCP Verified by CM: Yes Last Visit to PCP: 11/01/18 Mode of Transport at Discharge: Self Transition of Care Consult (CM Consult): Discharge Planning MyChart Signup: No 
Discharge Durable Medical Equipment: No 
Physical Therapy Consult: No 
Occupational Therapy Consult: No 
Speech Therapy Consult: No 
Current Support Network: Relative's Home Confirm Follow Up Transport: Family Plan discussed with Pt/Family/Caregiver: Yes Discharge Location Discharge Placement: Home [facility], [address] [facility], [address] [facility] no breast tenderness L/no breast tenderness R/no breast lump L/no breast lump R

## 2021-06-11 LAB
ALBUMIN SERPL ELPH-MCNC: 4.1 G/DL
ALP BLD-CCNC: 62 U/L
ALT SERPL-CCNC: 14 U/L
ANION GAP SERPL CALC-SCNC: 13 MMOL/L
APTT BLD: 34.1 SEC
AST SERPL-CCNC: 17 U/L
BASOPHILS # BLD AUTO: 0.05 K/UL
BASOPHILS NFR BLD AUTO: 0.8 %
BILIRUB SERPL-MCNC: 0.3 MG/DL
BUN SERPL-MCNC: 18 MG/DL
CALCIUM SERPL-MCNC: 10 MG/DL
CHLORIDE SERPL-SCNC: 103 MMOL/L
CO2 SERPL-SCNC: 27 MMOL/L
CREAT SERPL-MCNC: 0.94 MG/DL
EOSINOPHIL # BLD AUTO: 0.12 K/UL
EOSINOPHIL NFR BLD AUTO: 2 %
GLUCOSE SERPL-MCNC: 95 MG/DL
HCT VFR BLD CALC: 41.9 %
HGB BLD-MCNC: 13 G/DL
IMM GRANULOCYTES NFR BLD AUTO: 0.3 %
INR PPP: 1.02 RATIO
LYMPHOCYTES # BLD AUTO: 2.24 K/UL
LYMPHOCYTES NFR BLD AUTO: 36.4 %
MAN DIFF?: NORMAL
MCHC RBC-ENTMCNC: 28 PG
MCHC RBC-ENTMCNC: 31 GM/DL
MCV RBC AUTO: 90.3 FL
MONOCYTES # BLD AUTO: 0.5 K/UL
MONOCYTES NFR BLD AUTO: 8.1 %
NEUTROPHILS # BLD AUTO: 3.22 K/UL
NEUTROPHILS NFR BLD AUTO: 52.4 %
PLATELET # BLD AUTO: 253 K/UL
POTASSIUM SERPL-SCNC: 3.8 MMOL/L
PROT SERPL-MCNC: 7.1 G/DL
PT BLD: 12 SEC
RBC # BLD: 4.64 M/UL
RBC # FLD: 15.3 %
SODIUM SERPL-SCNC: 142 MMOL/L
WBC # FLD AUTO: 6.15 K/UL

## 2021-06-12 ENCOUNTER — APPOINTMENT (OUTPATIENT)
Dept: DISASTER EMERGENCY | Facility: CLINIC | Age: 71
End: 2021-06-12

## 2021-06-14 ENCOUNTER — TRANSCRIPTION ENCOUNTER (OUTPATIENT)
Age: 71
End: 2021-06-14

## 2021-06-14 NOTE — ASU PATIENT PROFILE, ADULT - PMH
Bilateral malignant neoplasm of breast in female, unspecified site of breast  1996 -  Left, 2015  - Right  Chest pain, non-cardiac    Endometrial intraepithelial neoplasia (EIN)    Essential hypertension    HGSIL (high grade squamous intraepithelial lesion) on Pap smear of cervix    Hyperlipidemia, unspecified hyperlipidemia type    Obesity

## 2021-06-15 ENCOUNTER — APPOINTMENT (OUTPATIENT)
Dept: GYNECOLOGIC ONCOLOGY | Facility: HOSPITAL | Age: 71
End: 2021-06-15

## 2021-06-15 ENCOUNTER — RESULT REVIEW (OUTPATIENT)
Age: 71
End: 2021-06-15

## 2021-06-15 ENCOUNTER — OUTPATIENT (OUTPATIENT)
Dept: OUTPATIENT SERVICES | Facility: HOSPITAL | Age: 71
LOS: 1 days | Discharge: HOME | End: 2021-06-15
Payer: COMMERCIAL

## 2021-06-15 VITALS
TEMPERATURE: 98 F | WEIGHT: 235.89 LBS | HEIGHT: 67 IN | RESPIRATION RATE: 18 BRPM | SYSTOLIC BLOOD PRESSURE: 158 MMHG | DIASTOLIC BLOOD PRESSURE: 70 MMHG | HEART RATE: 81 BPM | OXYGEN SATURATION: 98 %

## 2021-06-15 VITALS
SYSTOLIC BLOOD PRESSURE: 160 MMHG | HEART RATE: 78 BPM | DIASTOLIC BLOOD PRESSURE: 82 MMHG | OXYGEN SATURATION: 99 % | RESPIRATION RATE: 18 BRPM

## 2021-06-15 DIAGNOSIS — N85.02 ENDOMETRIAL INTRAEPITHELIAL NEOPLASIA [EIN]: ICD-10-CM

## 2021-06-15 DIAGNOSIS — Z98.890 OTHER SPECIFIED POSTPROCEDURAL STATES: Chronic | ICD-10-CM

## 2021-06-15 DIAGNOSIS — Z90.13 ACQUIRED ABSENCE OF BILATERAL BREASTS AND NIPPLES: Chronic | ICD-10-CM

## 2021-06-15 LAB
GLUCOSE BLDC GLUCOMTR-MCNC: 91 MG/DL — SIGNIFICANT CHANGE UP (ref 70–99)
HCT VFR BLD CALC: 41.4 % — SIGNIFICANT CHANGE UP (ref 34.5–45)
HGB BLD-MCNC: 12.8 G/DL — SIGNIFICANT CHANGE UP (ref 11.5–15.5)
MCHC RBC-ENTMCNC: 27.7 PG — SIGNIFICANT CHANGE UP (ref 27–34)
MCHC RBC-ENTMCNC: 30.9 GM/DL — LOW (ref 32–36)
MCV RBC AUTO: 89.6 FL — SIGNIFICANT CHANGE UP (ref 80–100)
NRBC # BLD: 0 /100 WBCS — SIGNIFICANT CHANGE UP
NRBC # FLD: 0 K/UL — SIGNIFICANT CHANGE UP
PLATELET # BLD AUTO: 234 K/UL — SIGNIFICANT CHANGE UP (ref 150–400)
RBC # BLD: 4.62 M/UL — SIGNIFICANT CHANGE UP (ref 3.8–5.2)
RBC # FLD: 14.6 % — HIGH (ref 10.3–14.5)
WBC # BLD: 8.18 K/UL — SIGNIFICANT CHANGE UP (ref 3.8–10.5)
WBC # FLD AUTO: 8.18 K/UL — SIGNIFICANT CHANGE UP (ref 3.8–10.5)

## 2021-06-15 PROCEDURE — 88342 IMHCHEM/IMCYTCHM 1ST ANTB: CPT | Mod: 26

## 2021-06-15 PROCEDURE — 88331 PATH CONSLTJ SURG 1 BLK 1SPC: CPT | Mod: 26

## 2021-06-15 PROCEDURE — 88307 TISSUE EXAM BY PATHOLOGIST: CPT | Mod: 26

## 2021-06-15 PROCEDURE — 88341 IMHCHEM/IMCYTCHM EA ADD ANTB: CPT | Mod: 26

## 2021-06-15 PROCEDURE — S2900 ROBOTIC SURGICAL SYSTEM: CPT | Mod: NC

## 2021-06-15 PROCEDURE — 38570 LAPAROSCOPY LYMPH NODE BIOP: CPT

## 2021-06-15 PROCEDURE — 88112 CYTOPATH CELL ENHANCE TECH: CPT | Mod: 26

## 2021-06-15 PROCEDURE — 38900 IO MAP OF SENT LYMPH NODE: CPT | Mod: 50

## 2021-06-15 PROCEDURE — 58571 TLH W/T/O 250 G OR LESS: CPT

## 2021-06-15 RX ORDER — CHLORHEXIDINE GLUCONATE 213 G/1000ML
1 SOLUTION TOPICAL ONCE
Refills: 0 | Status: COMPLETED | OUTPATIENT
Start: 2021-06-15 | End: 2021-06-15

## 2021-06-15 RX ORDER — MULTIVIT-MIN/FERROUS GLUCONATE 9 MG/15 ML
1 LIQUID (ML) ORAL
Qty: 0 | Refills: 0 | DISCHARGE

## 2021-06-15 RX ORDER — FENTANYL CITRATE 50 UG/ML
50 INJECTION INTRAVENOUS
Refills: 0 | Status: DISCONTINUED | OUTPATIENT
Start: 2021-06-15 | End: 2021-06-15

## 2021-06-15 RX ORDER — ONDANSETRON 8 MG/1
4 TABLET, FILM COATED ORAL ONCE
Refills: 0 | Status: DISCONTINUED | OUTPATIENT
Start: 2021-06-15 | End: 2021-06-29

## 2021-06-15 RX ORDER — SODIUM CHLORIDE 9 MG/ML
1000 INJECTION, SOLUTION INTRAVENOUS
Refills: 0 | Status: DISCONTINUED | OUTPATIENT
Start: 2021-06-15 | End: 2021-06-29

## 2021-06-15 RX ORDER — ERGOCALCIFEROL 1.25 MG/1
1 CAPSULE ORAL
Qty: 0 | Refills: 0 | DISCHARGE

## 2021-06-15 RX ORDER — FENTANYL CITRATE 50 UG/ML
25 INJECTION INTRAVENOUS
Refills: 0 | Status: DISCONTINUED | OUTPATIENT
Start: 2021-06-15 | End: 2021-06-15

## 2021-06-15 RX ORDER — SODIUM CHLORIDE 9 MG/ML
1000 INJECTION, SOLUTION INTRAVENOUS
Refills: 0 | Status: DISCONTINUED | OUTPATIENT
Start: 2021-06-15 | End: 2021-06-15

## 2021-06-15 RX ORDER — OXYCODONE HYDROCHLORIDE 5 MG/1
1 TABLET ORAL
Qty: 10 | Refills: 0
Start: 2021-06-15

## 2021-06-15 RX ADMIN — CHLORHEXIDINE GLUCONATE 1 APPLICATION(S): 213 SOLUTION TOPICAL at 06:41

## 2021-06-15 RX ADMIN — SODIUM CHLORIDE 30 MILLILITER(S): 9 INJECTION, SOLUTION INTRAVENOUS at 06:40

## 2021-06-15 NOTE — PROGRESS NOTE ADULT - SUBJECTIVE AND OBJECTIVE BOX
GYNECOLOGIC ONCOLOGY PA POST OP NOTE    Pt seen and examined at bedside. Pain well controlled. Pt denies headache, dizziness, nausea, vomiting, chest pain and sob. CBC sent to lab. Claudine in place. pt hasn't had anything to eat or drink yet.         OBJECTIVE:     VITALS:  T(F): 97.7 (06-15-21 @ 13:00), Max: 98.2 (06-15-21 @ 06:41)  HR: 64 (06-15-21 @ 13:00) (62 - 81)  BP: 147/87 (06-15-21 @ 13:00) (127/67 - 158/70)  RR: 14 (06-15-21 @ 13:00) (12 - 18)  SpO2: 97% (06-15-21 @ 13:00) (93% - 100%)  Wt(kg): --    I&O's Summary    15 Alec 2021 07:01  -  15 Alec 2021 13:29  --------------------------------------------------------  IN: 210 mL / OUT: 300 mL / NET: -90 mL        MEDICATIONS  (STANDING):  lactated ringers. 1000 milliLiter(s) (30 mL/Hr) IV Continuous <Continuous>    MEDICATIONS  (PRN):  fentaNYL    Injectable 25 MICROGram(s) IV Push every 5 minutes PRN Moderate Pain (4 - 6)  fentaNYL    Injectable 50 MICROGram(s) IV Push every 10 minutes PRN Severe Pain (7 - 10)  ondansetron Injectable 4 milliGRAM(s) IV Push once PRN Nausea and/or Vomiting      Physical Exam:  Constitutional: NAD  Pulmonary: clear to auscultation bilaterally   Cardiovascular: Regular rate and rhythm   Abdomen: soft, non-distended, appropriate tenderness, scope sites C/D/I  Extremities: no lower extremity edema or calve tenderness

## 2021-06-15 NOTE — BRIEF OPERATIVE NOTE - OPERATION/FINDINGS
Abdominal survey revealed grossly normal uterus, fallopian tubes and ovaries. Upper abdomen grossly normal. Adhesions noted between abdominal sidewall and omentum. A nodular white mass noted to be within the adhesion. Abdominal survey revealed grossly normal uterus, fallopian tubes and ovaries. Upper abdomen grossly normal. Adhesions from sigmoid colon to left pelvic sidewall, inflamed sigmoid colon epiploica

## 2021-06-15 NOTE — BRIEF OPERATIVE NOTE - NSICDXBRIEFPROCEDURE_GEN_ALL_CORE_FT
PROCEDURES:  Hysterectomy, total, with BSO, and bilateral pelvic lymph node dissection, robot assisted 15-Alec-2021 11:17:51  Papo Joy

## 2021-06-15 NOTE — ASU DISCHARGE PLAN (ADULT/PEDIATRIC) - CARE PROVIDER_API CALL
Esthela Nelson)  Gynecologic Oncology; Obstetrics and Gynecology  [address] [place] [address]  Phone: [phone]  Fax: [phone]  Follow Up Time:

## 2021-06-15 NOTE — PROGRESS NOTE ADULT - ASSESSMENT
69 yo female s/p Hysterectomy, total, with BSO, and bilateral pelvic lymph node dissection, robot assisted for Endometrial Intraepithelial Neoplasia(EIN) in stable condition  -regular diet  -LR@125  -pain management prn  -f/u cbc, if stable, remove trevizo  -plan is for discharge home once voiding and ambulating without difficulty, tolerating po diet  -f/u with Dr. Nelson in 2 weeks  -discharge instructions reviewed    [facility] PAC  #[id number]

## 2021-06-15 NOTE — ASU DISCHARGE PLAN (ADULT/PEDIATRIC) - ASU DC SPECIAL INSTRUCTIONSFT
Regular diet as tolerated, regular activity as tolerated, no heavy lifting for the first two weeks after surgery. Nothing in the vagina: no intercourse, tampons, or douching. Please call your provider if you experience fevers, chills, worsening abdominal pain, inability to urinate or heavy vaginal bleeding (soaking through 1 pad per hour). Please follow up with your provider in 2 weeks for a post operative check up.

## 2021-06-15 NOTE — ASU DISCHARGE PLAN (ADULT/PEDIATRIC) - NURSING INSTRUCTIONS
You received IV Tylenol for pain management at __10_. Please DO NOT take any Tylenol (Acetaminophen) containing products, such as Vicodin, Percocet, Excedrin, and cold medications for the next 6 hours (until __4_ PM). DO NOT TAKE MORE THAN 3000 MG OF TYLENOL in a 24 hour period.

## 2021-06-15 NOTE — ASU DISCHARGE PLAN (ADULT/PEDIATRIC) - FOLLOW UP APPOINTMENTS
may also call Recovery Room (PACU) 24/7 @ [phone]/[facility]

## 2021-06-15 NOTE — ASU DISCHARGE PLAN (ADULT/PEDIATRIC) - CALL YOUR DOCTOR IF YOU HAVE ANY OF THE FOLLOWING:
Bleeding that does not stop/Pain not relieved by Medications/Fever greater than (need to indicate Fahrenheit or Celsius)/Wound/Surgical Site with redness, or foul smelling discharge or pus Bleeding that does not stop/Pain not relieved by Medications/Fever greater than (need to indicate Fahrenheit or Celsius)/Wound/Surgical Site with redness, or foul smelling discharge or pus/Unable to urinate/Inability to tolerate liquids or foods/Increased irritability or sluggishness

## 2021-06-16 LAB — NON-GYNECOLOGICAL CYTOLOGY STUDY: SIGNIFICANT CHANGE UP

## 2021-06-17 PROBLEM — N85.02 ENDOMETRIAL INTRAEPITHELIAL NEOPLASIA [EIN]: Chronic | Status: ACTIVE | Noted: 2021-06-08

## 2021-06-24 LAB — SURGICAL PATHOLOGY STUDY: SIGNIFICANT CHANGE UP

## 2021-06-28 ENCOUNTER — APPOINTMENT (OUTPATIENT)
Dept: GYNECOLOGIC ONCOLOGY | Facility: CLINIC | Age: 71
End: 2021-06-28
Payer: COMMERCIAL

## 2021-06-28 VITALS
DIASTOLIC BLOOD PRESSURE: 86 MMHG | WEIGHT: 233 LBS | TEMPERATURE: 98 F | HEIGHT: 68 IN | SYSTOLIC BLOOD PRESSURE: 153 MMHG | BODY MASS INDEX: 35.31 KG/M2 | HEART RATE: 75 BPM

## 2021-06-28 PROCEDURE — 99024 POSTOP FOLLOW-UP VISIT: CPT

## 2021-08-02 ENCOUNTER — APPOINTMENT (OUTPATIENT)
Dept: GYNECOLOGIC ONCOLOGY | Facility: CLINIC | Age: 71
End: 2021-08-02
Payer: COMMERCIAL

## 2021-08-02 VITALS
BODY MASS INDEX: 35.61 KG/M2 | TEMPERATURE: 97.7 F | SYSTOLIC BLOOD PRESSURE: 132 MMHG | DIASTOLIC BLOOD PRESSURE: 75 MMHG | HEART RATE: 83 BPM | WEIGHT: 235 LBS | HEIGHT: 68 IN

## 2021-08-02 PROCEDURE — 99024 POSTOP FOLLOW-UP VISIT: CPT

## 2021-08-02 RX ORDER — DICLOFENAC SODIUM 3 G/100G
3 GEL TOPICAL TWICE DAILY
Qty: 1 | Refills: 0 | Status: DISCONTINUED | COMMUNITY
Start: 2018-08-20 | End: 2021-08-02

## 2021-08-05 ENCOUNTER — RX RENEWAL (OUTPATIENT)
Age: 71
End: 2021-08-05

## 2021-08-17 ENCOUNTER — APPOINTMENT (OUTPATIENT)
Dept: INTERNAL MEDICINE | Facility: CLINIC | Age: 71
End: 2021-08-17

## 2021-10-22 NOTE — H&P PST ADULT - NEGATIVE CARDIOVASCULAR SYMPTOMS
no chest pain/no palpitations/no dyspnea on exertion/no peripheral edema
PAST SURGICAL HISTORY:  No significant past surgical history

## 2021-11-18 ENCOUNTER — NON-APPOINTMENT (OUTPATIENT)
Age: 71
End: 2021-11-18

## 2021-11-18 ENCOUNTER — APPOINTMENT (OUTPATIENT)
Dept: OPHTHALMOLOGY | Facility: CLINIC | Age: 71
End: 2021-11-18
Payer: COMMERCIAL

## 2021-11-18 PROCEDURE — 92014 COMPRE OPH EXAM EST PT 1/>: CPT

## 2021-11-18 PROCEDURE — 76514 ECHO EXAM OF EYE THICKNESS: CPT

## 2021-11-24 ENCOUNTER — LABORATORY RESULT (OUTPATIENT)
Age: 71
End: 2021-11-24

## 2021-11-24 ENCOUNTER — APPOINTMENT (OUTPATIENT)
Dept: INTERNAL MEDICINE | Facility: CLINIC | Age: 71
End: 2021-11-24
Payer: COMMERCIAL

## 2021-11-24 VITALS
BODY MASS INDEX: 36.07 KG/M2 | OXYGEN SATURATION: 99 % | SYSTOLIC BLOOD PRESSURE: 126 MMHG | HEART RATE: 78 BPM | TEMPERATURE: 97.8 F | WEIGHT: 238 LBS | DIASTOLIC BLOOD PRESSURE: 77 MMHG | HEIGHT: 68 IN

## 2021-11-24 PROCEDURE — G0447 BEHAVIOR COUNSEL OBESITY 15M: CPT

## 2021-11-24 PROCEDURE — 99397 PER PM REEVAL EST PAT 65+ YR: CPT

## 2021-11-24 PROCEDURE — 99215 OFFICE O/P EST HI 40 MIN: CPT | Mod: 25

## 2021-11-24 RX ORDER — OXYCODONE HYDROCHLORIDE 5 MG/1
5 CAPSULE ORAL
Qty: 10 | Refills: 0 | Status: DISCONTINUED | COMMUNITY
Start: 2021-06-15 | End: 2021-11-24

## 2021-11-24 NOTE — HISTORY OF PRESENT ILLNESS
[FreeTextEntry1] : Presents for preventive visit as well as concerns regarding hypertension, obesity and breast cancer. [de-identified] : \par Preventive visit: pt is up to date with vaccines; she sees her GYN for breast/PAP/pelvic exams; she is up to date with her colonoscopy; she does not smoke cigarettes and does not misuse alcohol; she feels safe at home and has smoke/CO detectors in the house; she wears seatbelts when in vehicles\par \par Medical Issue 1: Hypertension: pt says she is taking her medication daily and denies side effects; she has no headaches, CP, SOB and palpitations; she denies vision changes; she needs refill for her medication\par \par Medical Issue 2: Obesity: pt says she is struggling to lose weight; she is trying to exercise regularly and watch her diet but finds this to be challenging; she is asking for help with resources to lose weight\par \par Medical Issue 3: Breast cancer: pt says she is following up with her breast surgeon; she underwent complete mastectomy with reconstructive surgery and feels well; she has intermittent chest wall pains over her surgical scars but no redness, warmth or swelling that she has noticed

## 2021-11-24 NOTE — HEALTH RISK ASSESSMENT
[Good] : ~his/her~  mood as  good [] : No [No falls in past year] : Patient reported no falls in the past year [0] : 2) Feeling down, depressed, or hopeless: Not at all (0) [de-identified] : GYN [[id number]] : 0 [Change in mental status noted] : No change in mental status noted [Language] : denies difficulty with language [Behavior] : denies difficulty with behavior [Learning/Retaining New Information] : denies difficulty learning/retaining new information [Handling Complex Tasks] : denies difficulty handling complex tasks [Reasoning] : denies difficulty with reasoning [Spatial Ability and Orientation] : denies difficulty with spatial ability and orientation [None] : None [Alone] : lives alone [Employed] : employed [Sexually Active] : not sexually active [High Risk Behavior] : no high risk behavior [Feels Safe at Home] : Feels safe at home [Fully functional (bathing, dressing, toileting, transferring, walking, feeding)] : Fully functional (bathing, dressing, toileting, transferring, walking, feeding) [Fully functional (using the telephone, shopping, preparing meals, housekeeping, doing laundry, using] : Fully functional and needs no help or supervision to perform IADLs (using the telephone, shopping, preparing meals, housekeeping, doing laundry, using transportation, managing medications and managing finances) [Reports changes in hearing] : Reports no changes in hearing [Reports changes in vision] : Reports no changes in vision [Reports normal functional visual acuity (ie: able to read med bottle)] : Reports normal functional visual acuity [Reports changes in dental health] : Reports no changes in dental health [Smoke Detector] : smoke detector [Carbon Monoxide Detector] : carbon monoxide detector [Guns at Home] : no guns at home [Safety elements used in home] : safety elements used in home [Seat Belt] :  uses seat belt [Sunscreen] : uses sunscreen [Travel to Developing Areas] : does not  travel to developing areas [TB Exposure] : is not being exposed to tuberculosis [Caregiver Concerns] : does not have caregiver concerns

## 2021-11-24 NOTE — ASSESSMENT
[FreeTextEntry1] : \par Preventive visit: pt is up to date with vaccines; she sees her GYN for breast/PAP/pelvic exams; she is up to date with her colonoscopy; praised pt's tobacco-free lifestyle; encouraged use of smoke/CO detectors in the house and use of seatbelts when in vehicles\par \par Medical Issue 1: Hypertension: BP stable, refilled medication Valsartan-HCTZ; check renal panel today\par \par Medical Issue 2: Obesity counselling: 15 mins, assessed BMI at 30 and above now in obese range; advised weight loss, exercise routine and diet; pt agreed to start exercise program for target weight loss 20 lbs; assisted patient with resources including nutrition referral as needed and arranged for follow-up to monitor weight loss progress over next several months\par \par Medical Issue 3: Breast cancer: coordinated follow-up care with her breast surgeon and oncologist

## 2021-11-24 NOTE — PHYSICAL EXAM
[No Acute Distress] : no acute distress [Well Nourished] : well nourished [Well Developed] : well developed [Well-Appearing] : well-appearing [Normal Sclera/Conjunctiva] : normal sclera/conjunctiva [PERRL] : pupils equal round and reactive to light [EOMI] : extraocular movements intact [No JVD] : no jugular venous distention [Supple] : supple [No Lymphadenopathy] : no lymphadenopathy [Thyroid Normal, No Nodules] : the thyroid was normal and there were no nodules present [No Respiratory Distress] : no respiratory distress  [Clear to Auscultation] : lungs were clear to auscultation bilaterally [No Accessory Muscle Use] : no accessory muscle use [Normal Rate] : normal rate  [Regular Rhythm] : with a regular rhythm [Normal S1, S2] : normal S1 and S2 [No Murmur] : no murmur heard [No Carotid Bruits] : no carotid bruits [No Abdominal Bruit] : a ~M bruit was not heard ~T in the abdomen [No Varicosities] : no varicosities [Pedal Pulses Present] : the pedal pulses are present [No Edema] : there was no peripheral edema [No Extremity Clubbing/Cyanosis] : no extremity clubbing/cyanosis [No Palpable Aorta] : no palpable aorta [Normal Appearance] : normal in appearance [No Nipple Discharge] : no nipple discharge [No Axillary Lymphadenopathy] : no axillary lymphadenopathy [Soft] : abdomen soft [Non Tender] : non-tender [Non-distended] : non-distended [No Masses] : no abdominal mass palpated [No HSM] : no HSM [Normal Bowel Sounds] : normal bowel sounds [Normal Posterior Cervical Nodes] : no posterior cervical lymphadenopathy [Normal Anterior Cervical Nodes] : no anterior cervical lymphadenopathy [No CVA Tenderness] : no CVA  tenderness [No Spinal Tenderness] : no spinal tenderness [No Joint Swelling] : no joint swelling [Grossly Normal Strength/Tone] : grossly normal strength/tone [No Rash] : no rash [Normal Gait] : normal gait [Coordination Grossly Intact] : coordination grossly intact [No Focal Deficits] : no focal deficits [Deep Tendon Reflexes (DTR)] : deep tendon reflexes were 2+ and symmetric [Normal Affect] : the affect was normal [Normal Insight/Judgement] : insight and judgment were intact [de-identified] : scars noted without erythema and swelling

## 2021-12-01 LAB
25(OH)D3 SERPL-MCNC: 49.9 NG/ML
ALBUMIN SERPL ELPH-MCNC: 4.3 G/DL
ALP BLD-CCNC: 78 U/L
ALT SERPL-CCNC: 14 U/L
ANION GAP SERPL CALC-SCNC: 17 MMOL/L
AST SERPL-CCNC: 13 U/L
BASOPHILS # BLD AUTO: 0.04 K/UL
BASOPHILS NFR BLD AUTO: 0.6 %
BILIRUB SERPL-MCNC: 0.2 MG/DL
BUN SERPL-MCNC: 15 MG/DL
CALCIUM SERPL-MCNC: 10.1 MG/DL
CHLORIDE SERPL-SCNC: 99 MMOL/L
CHOLEST SERPL-MCNC: 187 MG/DL
CO2 SERPL-SCNC: 25 MMOL/L
CREAT SERPL-MCNC: 0.77 MG/DL
EOSINOPHIL # BLD AUTO: 0.08 K/UL
EOSINOPHIL NFR BLD AUTO: 1.2 %
ESTIMATED AVERAGE GLUCOSE: 117 MG/DL
GLUCOSE SERPL-MCNC: 94 MG/DL
HBA1C MFR BLD HPLC: 5.7 %
HCT VFR BLD CALC: 41.4 %
HDLC SERPL-MCNC: 78 MG/DL
HGB BLD-MCNC: 13.1 G/DL
HIV1+2 AB SPEC QL IA.RAPID: NONREACTIVE
HSV 1+2 IGG SER IA-IMP: POSITIVE
HSV 1+2 IGG SER IA-IMP: POSITIVE
HSV1 IGG SER QL: 43.6 INDEX
HSV2 IGG SER QL: >23.6 INDEX
IMM GRANULOCYTES NFR BLD AUTO: 0.3 %
LDLC SERPL CALC-MCNC: 84 MG/DL
LYMPHOCYTES # BLD AUTO: 2.16 K/UL
LYMPHOCYTES NFR BLD AUTO: 31.6 %
MAN DIFF?: NORMAL
MCHC RBC-ENTMCNC: 28.2 PG
MCHC RBC-ENTMCNC: 31.6 GM/DL
MCV RBC AUTO: 89.2 FL
MONOCYTES # BLD AUTO: 0.5 K/UL
MONOCYTES NFR BLD AUTO: 7.3 %
NEUTROPHILS # BLD AUTO: 4.03 K/UL
NEUTROPHILS NFR BLD AUTO: 59 %
NONHDLC SERPL-MCNC: 109 MG/DL
PLATELET # BLD AUTO: 232 K/UL
POTASSIUM SERPL-SCNC: 4.5 MMOL/L
PROT SERPL-MCNC: 7.4 G/DL
RBC # BLD: 4.64 M/UL
RBC # FLD: 14.6 %
SODIUM SERPL-SCNC: 141 MMOL/L
T PALLIDUM AB SER QL IA: POSITIVE
TRIGL SERPL-MCNC: 122 MG/DL
TSH SERPL-ACNC: 0.97 UIU/ML
WBC # FLD AUTO: 6.83 K/UL

## 2021-12-02 ENCOUNTER — APPOINTMENT (OUTPATIENT)
Dept: OPHTHALMOLOGY | Facility: CLINIC | Age: 71
End: 2021-12-02
Payer: COMMERCIAL

## 2021-12-02 ENCOUNTER — NON-APPOINTMENT (OUTPATIENT)
Age: 71
End: 2021-12-02

## 2021-12-02 LAB
HSV1 IGM SER QL: NEGATIVE
HSV2 AB FLD-ACNC: NEGATIVE

## 2021-12-02 PROCEDURE — 92133 CPTRZD OPH DX IMG PST SGM ON: CPT

## 2021-12-02 PROCEDURE — 92014 COMPRE OPH EXAM EST PT 1/>: CPT

## 2021-12-02 PROCEDURE — 92083 EXTENDED VISUAL FIELD XM: CPT

## 2022-04-12 ENCOUNTER — LABORATORY RESULT (OUTPATIENT)
Age: 72
End: 2022-04-12

## 2022-04-12 ENCOUNTER — APPOINTMENT (OUTPATIENT)
Dept: OBGYN | Facility: CLINIC | Age: 72
End: 2022-04-12
Payer: COMMERCIAL

## 2022-04-12 VITALS
WEIGHT: 235 LBS | SYSTOLIC BLOOD PRESSURE: 134 MMHG | BODY MASS INDEX: 35.61 KG/M2 | DIASTOLIC BLOOD PRESSURE: 84 MMHG | HEIGHT: 68 IN

## 2022-04-12 DIAGNOSIS — N76.0 ACUTE VAGINITIS: ICD-10-CM

## 2022-04-12 DIAGNOSIS — N84.0 POLYP OF CORPUS UTERI: ICD-10-CM

## 2022-04-12 DIAGNOSIS — N84.1 POLYP OF CERVIX UTERI: ICD-10-CM

## 2022-04-12 DIAGNOSIS — Z01.818 ENCOUNTER FOR OTHER PREPROCEDURAL EXAMINATION: ICD-10-CM

## 2022-04-12 DIAGNOSIS — Z87.42 PERSONAL HISTORY OF OTHER DISEASES OF THE FEMALE GENITAL TRACT: ICD-10-CM

## 2022-04-12 PROCEDURE — 99213 OFFICE O/P EST LOW 20 MIN: CPT

## 2022-04-12 NOTE — HISTORY OF PRESENT ILLNESS
[FreeTextEntry1] : Patient with rash in her inner thighs \par It is now better \par She treated it with Monistat\par She was recently sexually active and would like STI testing \par She had a robotic hysterectomy and BSO for a polyp with complex hyperplasia\par Final pathology was benign

## 2022-04-14 LAB
C TRACH RRNA SPEC QL NAA+PROBE: NOT DETECTED
HIV1+2 AB SPEC QL IA.RAPID: NONREACTIVE
N GONORRHOEA RRNA SPEC QL NAA+PROBE: NOT DETECTED
SOURCE AMPLIFICATION: NORMAL
T PALLIDUM AB SER QL IA: POSITIVE

## 2022-06-02 ENCOUNTER — APPOINTMENT (OUTPATIENT)
Dept: OPHTHALMOLOGY | Facility: CLINIC | Age: 72
End: 2022-06-02
Payer: COMMERCIAL

## 2022-06-02 ENCOUNTER — NON-APPOINTMENT (OUTPATIENT)
Age: 72
End: 2022-06-02

## 2022-06-02 PROCEDURE — 92133 CPTRZD OPH DX IMG PST SGM ON: CPT

## 2022-06-02 PROCEDURE — 92014 COMPRE OPH EXAM EST PT 1/>: CPT

## 2022-07-25 ENCOUNTER — RX RENEWAL (OUTPATIENT)
Age: 72
End: 2022-07-25

## 2022-10-27 NOTE — H&P PST ADULT - BACK
Patient is requesting a call back from the clinical team regarding her results   Please advise detailed exam

## 2022-11-02 ENCOUNTER — APPOINTMENT (OUTPATIENT)
Dept: OPHTHALMOLOGY | Facility: CLINIC | Age: 72
End: 2022-11-02

## 2022-11-02 ENCOUNTER — NON-APPOINTMENT (OUTPATIENT)
Age: 72
End: 2022-11-02

## 2022-11-02 PROCEDURE — 92083 EXTENDED VISUAL FIELD XM: CPT

## 2022-11-02 PROCEDURE — 92133 CPTRZD OPH DX IMG PST SGM ON: CPT

## 2022-11-02 PROCEDURE — 92012 INTRM OPH EXAM EST PATIENT: CPT

## 2022-11-08 NOTE — ASU PATIENT PROFILE, ADULT - PATIENT'S HEIGHT AND WEIGHT RECORDED IN THE VITAL SIGNS FLOWSHEET
PA denied by [facility] [employer] due to not meeting the step therapy rule for the requested drug. Standard step therapy requires that you have tried preferred optons before receiving coverage for this drug   yes

## 2022-11-09 ENCOUNTER — APPOINTMENT (OUTPATIENT)
Dept: INTERNAL MEDICINE | Facility: CLINIC | Age: 72
End: 2022-11-09

## 2022-11-09 VITALS
DIASTOLIC BLOOD PRESSURE: 74 MMHG | BODY MASS INDEX: 35.01 KG/M2 | SYSTOLIC BLOOD PRESSURE: 135 MMHG | WEIGHT: 231 LBS | HEIGHT: 68 IN

## 2022-11-09 VITALS
DIASTOLIC BLOOD PRESSURE: 74 MMHG | SYSTOLIC BLOOD PRESSURE: 135 MMHG | BODY MASS INDEX: 35.01 KG/M2 | WEIGHT: 231 LBS | HEIGHT: 68 IN | OXYGEN SATURATION: 97 % | TEMPERATURE: 97.6 F | HEART RATE: 88 BPM

## 2022-11-09 DIAGNOSIS — Z13.39 ENCOUNTER FOR SCREENING EXAM FOR OTHER MENTAL HEALTH AND BEHAVIORAL DISORDERS: ICD-10-CM

## 2022-11-09 DIAGNOSIS — E66.9 OBESITY, UNSPECIFIED: ICD-10-CM

## 2022-11-09 DIAGNOSIS — Z13.31 ENCOUNTER FOR SCREENING FOR DEPRESSION: ICD-10-CM

## 2022-11-09 PROCEDURE — G0447 BEHAVIOR COUNSEL OBESITY 15M: CPT | Mod: 59

## 2022-11-09 PROCEDURE — G0444 DEPRESSION SCREEN ANNUAL: CPT | Mod: 59

## 2022-11-09 PROCEDURE — 99215 OFFICE O/P EST HI 40 MIN: CPT | Mod: 25

## 2022-11-09 PROCEDURE — G0442 ANNUAL ALCOHOL SCREEN 15 MIN: CPT | Mod: 59

## 2022-11-09 NOTE — HISTORY OF PRESENT ILLNESS
[FreeTextEntry1] : Presents for preventive visit as well as concerns regarding hypertension, obesity and breast cancer. [de-identified] : \par Preventive visit: pt is up to date with vaccines; she sees her GYN for breast/PAP/pelvic exams; she is up to date with her colonoscopy; she does not smoke cigarettes and does not misuse alcohol; she feels safe at home and has smoke/CO detectors in the house; she wears seatbelts when in vehicles\par \par Medical Issue 1: Hypertension: pt says she is taking her medication daily and denies side effects; she has no headaches, CP, SOB and palpitations; she denies vision changes; she needs refill for her medication\par \par Medical Issue 2: Obesity: pt says she is struggling to lose weight; she is trying to exercise regularly and watch her diet but finds this to be challenging; she is asking for help with resources to lose weight\par \par Medical Issue 3: Breast cancer: pt says she is following up with her breast surgeon; she underwent complete mastectomy with reconstructive surgery and feels well; she has intermittent chest wall pains over her surgical scars but no redness, warmth or swelling that she has noticed

## 2022-11-09 NOTE — HEALTH RISK ASSESSMENT
[Good] : ~his/her~  mood as  good [No] : No [1 or 2 (0 pts)] : 1 or 2 (0 points) [Never (0 pts)] : Never (0 points) [No falls in past year] : Patient reported no falls in the past year [0] : 2) Feeling down, depressed, or hopeless: Not at all (0) [PHQ-2 Negative - No further assessment needed] : PHQ-2 Negative - No further assessment needed [de-identified] : GYN [Audit-CScore] : 0 [[id number]] : 0 [Change in mental status noted] : No change in mental status noted [Language] : denies difficulty with language [Behavior] : denies difficulty with behavior [Learning/Retaining New Information] : denies difficulty learning/retaining new information [Handling Complex Tasks] : denies difficulty handling complex tasks [Reasoning] : denies difficulty with reasoning [Spatial Ability and Orientation] : denies difficulty with spatial ability and orientation [None] : None [Alone] : lives alone [Employed] : employed [Sexually Active] : not sexually active [High Risk Behavior] : no high risk behavior [Feels Safe at Home] : Feels safe at home [Fully functional (bathing, dressing, toileting, transferring, walking, feeding)] : Fully functional (bathing, dressing, toileting, transferring, walking, feeding) [Fully functional (using the telephone, shopping, preparing meals, housekeeping, doing laundry, using] : Fully functional and needs no help or supervision to perform IADLs (using the telephone, shopping, preparing meals, housekeeping, doing laundry, using transportation, managing medications and managing finances) [Reports changes in hearing] : Reports no changes in hearing [Reports changes in vision] : Reports no changes in vision [Reports normal functional visual acuity (ie: able to read med bottle)] : Reports normal functional visual acuity [Reports changes in dental health] : Reports no changes in dental health [Smoke Detector] : smoke detector [Carbon Monoxide Detector] : carbon monoxide detector [Guns at Home] : no guns at home [Safety elements used in home] : safety elements used in home [Seat Belt] :  uses seat belt [Sunscreen] : uses sunscreen [Travel to Developing Areas] : does not  travel to developing areas [TB Exposure] : is not being exposed to tuberculosis [Caregiver Concerns] : does not have caregiver concerns

## 2022-11-09 NOTE — PHYSICAL EXAM
[No Acute Distress] : no acute distress [Well Nourished] : well nourished [Well Developed] : well developed [Well-Appearing] : well-appearing [Normal Sclera/Conjunctiva] : normal sclera/conjunctiva [PERRL] : pupils equal round and reactive to light [EOMI] : extraocular movements intact [No JVD] : no jugular venous distention [Supple] : supple [No Lymphadenopathy] : no lymphadenopathy [Thyroid Normal, No Nodules] : the thyroid was normal and there were no nodules present [No Respiratory Distress] : no respiratory distress  [Clear to Auscultation] : lungs were clear to auscultation bilaterally [No Accessory Muscle Use] : no accessory muscle use [Normal Rate] : normal rate  [Regular Rhythm] : with a regular rhythm [Normal S1, S2] : normal S1 and S2 [No Murmur] : no murmur heard [No Carotid Bruits] : no carotid bruits [No Abdominal Bruit] : a ~M bruit was not heard ~T in the abdomen [No Varicosities] : no varicosities [Pedal Pulses Present] : the pedal pulses are present [No Edema] : there was no peripheral edema [No Extremity Clubbing/Cyanosis] : no extremity clubbing/cyanosis [No Palpable Aorta] : no palpable aorta [Normal Appearance] : normal in appearance [No Nipple Discharge] : no nipple discharge [No Axillary Lymphadenopathy] : no axillary lymphadenopathy [Soft] : abdomen soft [Non Tender] : non-tender [Non-distended] : non-distended [No Masses] : no abdominal mass palpated [No HSM] : no HSM [Normal Bowel Sounds] : normal bowel sounds [Normal Posterior Cervical Nodes] : no posterior cervical lymphadenopathy [Normal Anterior Cervical Nodes] : no anterior cervical lymphadenopathy [No CVA Tenderness] : no CVA  tenderness [No Spinal Tenderness] : no spinal tenderness [No Joint Swelling] : no joint swelling [Grossly Normal Strength/Tone] : grossly normal strength/tone [No Rash] : no rash [Normal Gait] : normal gait [Coordination Grossly Intact] : coordination grossly intact [No Focal Deficits] : no focal deficits [Deep Tendon Reflexes (DTR)] : deep tendon reflexes were 2+ and symmetric [Normal Affect] : the affect was normal [Normal Insight/Judgement] : insight and judgment were intact [de-identified] : scars noted without erythema and swelling

## 2022-11-09 NOTE — ASSESSMENT
[FreeTextEntry1] : \par Preventive visit: pt is up to date with vaccines; she sees her GYN for breast/PAP/pelvic exams; she is up to date with her colonoscopy; praised pt's tobacco-free lifestyle; encouraged use of smoke/CO detectors in the house and use of seatbelts when in vehicles\par \par Medical Issue 1: Hypertension: BP stable, refilled medication Valsartan-HCTZ; check renal panel today\par \par Medical Issue 2: Obesity counselling: 15 mins, assessed BMI at 30 and above now in obese range; advised weight loss, exercise routine and diet; pt agreed to start exercise program for target weight loss 20 lbs; assisted patient with resources including nutrition referral as needed and arranged for follow-up to monitor weight loss progress over next several months\par \par Medical Issue 3: Breast cancer: coordinated follow-up care with her breast surgeon and oncologist\par \par Depression screen performed today, PHQ2=0, negative.\par \par Annual alcohol misuse screen, 15 mins, done; negative.\par

## 2022-11-10 LAB
25(OH)D3 SERPL-MCNC: 56.2 NG/ML
ALBUMIN SERPL ELPH-MCNC: 4.1 G/DL
ALP BLD-CCNC: 73 U/L
ALT SERPL-CCNC: 9 U/L
ANION GAP SERPL CALC-SCNC: 10 MMOL/L
AST SERPL-CCNC: 15 U/L
BASOPHILS # BLD AUTO: 0.04 K/UL
BASOPHILS NFR BLD AUTO: 0.6 %
BILIRUB SERPL-MCNC: 0.4 MG/DL
BUN SERPL-MCNC: 13 MG/DL
CALCIUM SERPL-MCNC: 10.2 MG/DL
CHLORIDE SERPL-SCNC: 101 MMOL/L
CHOLEST SERPL-MCNC: 193 MG/DL
CO2 SERPL-SCNC: 29 MMOL/L
CREAT SERPL-MCNC: 0.78 MG/DL
EGFR: 82 ML/MIN/1.73M2
EOSINOPHIL # BLD AUTO: 0.12 K/UL
EOSINOPHIL NFR BLD AUTO: 1.9 %
ESTIMATED AVERAGE GLUCOSE: 120 MG/DL
GLUCOSE SERPL-MCNC: 102 MG/DL
HBA1C MFR BLD HPLC: 5.8 %
HCT VFR BLD CALC: 42.1 %
HDLC SERPL-MCNC: 90 MG/DL
HGB BLD-MCNC: 13 G/DL
IMM GRANULOCYTES NFR BLD AUTO: 0.3 %
LDLC SERPL CALC-MCNC: 91 MG/DL
LYMPHOCYTES # BLD AUTO: 2.12 K/UL
LYMPHOCYTES NFR BLD AUTO: 33.8 %
MAN DIFF?: NORMAL
MCHC RBC-ENTMCNC: 27 PG
MCHC RBC-ENTMCNC: 30.9 GM/DL
MCV RBC AUTO: 87.5 FL
MONOCYTES # BLD AUTO: 0.36 K/UL
MONOCYTES NFR BLD AUTO: 5.7 %
NEUTROPHILS # BLD AUTO: 3.61 K/UL
NEUTROPHILS NFR BLD AUTO: 57.7 %
NONHDLC SERPL-MCNC: 103 MG/DL
PLATELET # BLD AUTO: 290 K/UL
POTASSIUM SERPL-SCNC: 4 MMOL/L
PROT SERPL-MCNC: 7.6 G/DL
RBC # BLD: 4.81 M/UL
RBC # FLD: 14.9 %
SODIUM SERPL-SCNC: 141 MMOL/L
TRIGL SERPL-MCNC: 63 MG/DL
TSH SERPL-ACNC: 0.81 UIU/ML
WBC # FLD AUTO: 6.27 K/UL

## 2022-11-14 LAB — VIT B12 USB SERPL-MCNC: 833 PG/ML

## 2023-03-05 NOTE — H&P PST ADULT - REASON FOR ADMISSION
Child awake and talking without distress. Smiling and waving at everyone.  
MD suturing lower lip. Assist of 2. Tolerating well.   
Mother states understanding of discharge instructions.  
O2 sat in use.   
"I am having a hysterectomy"

## 2023-05-15 NOTE — REVIEW OF SYSTEMS
"Oncology Rooming Note    May 15, 2023 9:02 AM   Onur Barr is a 68 year old male who presents for:    Chief Complaint   Patient presents with     Oncology Clinic Visit     New consult Lymphocytosis     Initial Vitals: BP (!) 146/99   Pulse 60   Resp 18   Ht 1.854 m (6' 1\")   Wt 148.8 kg (328 lb)   SpO2 96%   BMI 43.27 kg/m   Estimated body mass index is 43.27 kg/m  as calculated from the following:    Height as of this encounter: 1.854 m (6' 1\").    Weight as of this encounter: 148.8 kg (328 lb). Body surface area is 2.77 meters squared.  Severe Pain (7) Comment: Data Unavailable   No LMP for male patient.  Allergies reviewed: Yes  Medications reviewed: Yes    Medications: Medication refills not needed today.  Pharmacy name entered into [facility]: [id number] - [place], [place] - [address]    Clinical concerns:  New consult CLL      Carlee Alcaraz            " [Negative] : Heme/Lymph

## 2023-06-26 ENCOUNTER — APPOINTMENT (OUTPATIENT)
Dept: INTERNAL MEDICINE | Facility: CLINIC | Age: 73
End: 2023-06-26
Payer: MEDICARE

## 2023-06-26 ENCOUNTER — NON-APPOINTMENT (OUTPATIENT)
Age: 73
End: 2023-06-26

## 2023-06-26 VITALS
HEART RATE: 79 BPM | HEIGHT: 68 IN | SYSTOLIC BLOOD PRESSURE: 112 MMHG | OXYGEN SATURATION: 96 % | BODY MASS INDEX: 34.86 KG/M2 | WEIGHT: 230 LBS | DIASTOLIC BLOOD PRESSURE: 68 MMHG | TEMPERATURE: 97.5 F

## 2023-06-26 DIAGNOSIS — R94.31 ABNORMAL ELECTROCARDIOGRAM [ECG] [EKG]: ICD-10-CM

## 2023-06-26 DIAGNOSIS — C50.919 MALIGNANT NEOPLASM OF UNSPECIFIED SITE OF UNSPECIFIED FEMALE BREAST: ICD-10-CM

## 2023-06-26 DIAGNOSIS — I10 ESSENTIAL (PRIMARY) HYPERTENSION: ICD-10-CM

## 2023-06-26 DIAGNOSIS — Z00.00 ENCOUNTER FOR GENERAL ADULT MEDICAL EXAMINATION W/OUT ABNORMAL FINDINGS: ICD-10-CM

## 2023-06-26 DIAGNOSIS — N85.02 ENDOMETRIAL INTRAEPITHELIAL NEOPLASIA [EIN]: ICD-10-CM

## 2023-06-26 PROCEDURE — G0439: CPT

## 2023-06-26 PROCEDURE — 93000 ELECTROCARDIOGRAM COMPLETE: CPT

## 2023-06-26 RX ORDER — CHROMIUM 200 MCG
25 MCG TABLET ORAL
Refills: 0 | Status: ACTIVE | COMMUNITY
Start: 2023-06-26

## 2023-06-26 RX ORDER — MULTIVITAMIN
TABLET ORAL
Refills: 0 | Status: ACTIVE | COMMUNITY
Start: 2023-06-26

## 2023-06-26 RX ORDER — NAPROXEN 500 MG/1
500 TABLET ORAL
Qty: 28 | Refills: 0 | Status: DISCONTINUED | COMMUNITY
Start: 2021-06-15 | End: 2023-06-26

## 2023-06-27 LAB
ALBUMIN SERPL ELPH-MCNC: 4.4 G/DL
ALP BLD-CCNC: 77 U/L
ALT SERPL-CCNC: 16 U/L
ANION GAP SERPL CALC-SCNC: 11 MMOL/L
AST SERPL-CCNC: 17 U/L
BILIRUB SERPL-MCNC: 0.4 MG/DL
BUN SERPL-MCNC: 13 MG/DL
CALCIUM SERPL-MCNC: 9.6 MG/DL
CHLORIDE SERPL-SCNC: 104 MMOL/L
CHOLEST SERPL-MCNC: 178 MG/DL
CO2 SERPL-SCNC: 28 MMOL/L
CREAT SERPL-MCNC: 0.96 MG/DL
EGFR: 64 ML/MIN/1.73M2
ESTIMATED AVERAGE GLUCOSE: 126 MG/DL
GLUCOSE SERPL-MCNC: 90 MG/DL
HBA1C MFR BLD HPLC: 6 %
HDLC SERPL-MCNC: 77 MG/DL
LDLC SERPL CALC-MCNC: 86 MG/DL
NONHDLC SERPL-MCNC: 101 MG/DL
POTASSIUM SERPL-SCNC: 4.4 MMOL/L
PROT SERPL-MCNC: 7.5 G/DL
SODIUM SERPL-SCNC: 143 MMOL/L
TRIGL SERPL-MCNC: 71 MG/DL

## 2023-06-27 NOTE — HISTORY OF PRESENT ILLNESS
[de-identified] : Earline Carvalho is 71yo F with PMhx of Breast ca s/p mastectomy/radiation/chemo, HTN who presents to transfer care. She would like to obtain her CPE today.\par \par Planning to travel to [place] for 6-8 months leaving mid-next month\par Specifically requests an ECG. Reports she had a cath done prior to pre-op evaluation for hysterectomy due to previous positive stress test in 2018. She experiences intermittent chest allison nwith breathing which she has long attributed to her mastectomy surgery\par \par [place] D \par MVI\par \par No alcohol\par No marijuana\par \par

## 2023-06-27 NOTE — HEALTH RISK ASSESSMENT
[No] : In the past 12 months have you used drugs other than those required for medical reasons? No [No falls in past year] : Patient reported no falls in the past year [0] : 2) Feeling down, depressed, or hopeless: Not at all (0) [PHQ-2 Negative - No further assessment needed] : PHQ-2 Negative - No further assessment needed [With Family] : lives with family [Fully functional (bathing, dressing, toileting, transferring, walking, feeding)] : Fully functional (bathing, dressing, toileting, transferring, walking, feeding) [Fully functional (using the telephone, shopping, preparing meals, housekeeping, doing laundry, using] : Fully functional and needs no help or supervision to perform IADLs (using the telephone, shopping, preparing meals, housekeeping, doing laundry, using transportation, managing medications and managing finances) [Retired] : retired [Never] : Never [[id number]] : 0 [Sexually Active] : not sexually active [Reports changes in hearing] : Reports no changes in hearing [Reports changes in vision] : Reports no changes in vision [Reports changes in dental health] : Reports no changes in dental health [de-identified] : With daughter, traveling next week. Wants to live most of her time in [place] [de-identified] : Rarely active but has 1 partner

## 2023-06-27 NOTE — PHYSICAL EXAM
[No Acute Distress] : no acute distress [Well Developed] : well developed [Well-Appearing] : well-appearing [Normal Sclera/Conjunctiva] : normal sclera/conjunctiva [PERRL] : pupils equal round and reactive to light [EOMI] : extraocular movements intact [Normal Outer Ear/Nose] : the outer ears and nose were normal in appearance [Normal Oropharynx] : the oropharynx was normal [Supple] : supple [No Respiratory Distress] : no respiratory distress  [No Accessory Muscle Use] : no accessory muscle use [Clear to Auscultation] : lungs were clear to auscultation bilaterally [Normal Rate] : normal rate  [Regular Rhythm] : with a regular rhythm [Normal S1, S2] : normal S1 and S2 [No Murmur] : no murmur heard [Pedal Pulses Present] : the pedal pulses are present [No Edema] : there was no peripheral edema [Normal Appearance] : normal in appearance [No Axillary Lymphadenopathy] : no axillary lymphadenopathy [Soft] : abdomen soft [Non-distended] : non-distended [Non Tender] : non-tender [No Masses] : no abdominal mass palpated [Grossly Normal Strength/Tone] : grossly normal strength/tone [No Rash] : no rash [Normal Affect] : the affect was normal [Normal Insight/Judgement] : insight and judgment were intact [de-identified] : Dentures in [de-identified] : Varicosities noted [de-identified] : s/p reconstruction. Rubbery 2cm nodule palpated adjacent to sternum in the RUQ of the right breast. [de-identified] : Slight lisp noted. Otherwise tongue without deviation. Answering questions appropriately. Gets on exam table and ambulates without assistance

## 2023-07-10 ENCOUNTER — APPOINTMENT (OUTPATIENT)
Dept: CARDIOLOGY | Facility: CLINIC | Age: 73
End: 2023-07-10
Payer: MEDICARE

## 2023-07-10 ENCOUNTER — NON-APPOINTMENT (OUTPATIENT)
Age: 73
End: 2023-07-10

## 2023-07-10 VITALS
DIASTOLIC BLOOD PRESSURE: 69 MMHG | WEIGHT: 230 LBS | HEART RATE: 79 BPM | OXYGEN SATURATION: 98 % | HEIGHT: 68 IN | SYSTOLIC BLOOD PRESSURE: 124 MMHG | BODY MASS INDEX: 34.86 KG/M2

## 2023-07-10 PROCEDURE — 93000 ELECTROCARDIOGRAM COMPLETE: CPT

## 2023-07-10 PROCEDURE — 99203 OFFICE O/P NEW LOW 30 MIN: CPT

## 2023-07-10 NOTE — ASSESSMENT
[FreeTextEntry1] : This is a 71 yo with ho breast cancer sp double mastectomy/radiation/chemotherapy and hypertension who is referred for abnormal ECG (junctional Rhythm)\par \par 1. Abnormal ECG\par After reading the ECG on 6/2023, this was sinus rhythm with poor ECG tracing but you could also see negative P waves which could be just an ectopic sinus rhythm. On the ECG done today at cardiology clinic, there is normal sinus rhythm without STTW changes. She currently is asymptomatic. No chest pain with exertion or shortness of breath. She has no limitations in activity. \par \par 2. Health maintenance:\par -LIPID panel is within normal limits. Her only risk factor is being menopausal and age.  \par -Suggest routine exercise and educated her on her 85% target heart rate which was 120 bpm.\par -She has already made changes to her diet and avoided sweets and starches\par \par She can follow-up in 1 year.

## 2023-07-10 NOTE — HISTORY OF PRESENT ILLNESS
[FreeTextEntry1] : This is a 73 yo with ho breast cancer sp double mastectomy/radiation/chemotherapy and hypertension who is referred for abnormal ECG. \par \par Historically, she had a cardiac catheterization prior to hysterectomy () when she had a polypectomy she had a cardiac catheterization here (thought no record of this which was about 7 years ago by Dr. Cassidy) because she was having chest pain.\par \par She had breast reconstruction surgery and she still has chest pain. It's not even a chest pain, when she takes a deep breath difficult to describe. She thinks it's due to the breast reconstruction surgery. She doesn't do exercise routinely but she does gardening. She walks, maybe not fast but doesn't have limitations. She's able to walk a mile.  \par She's cut out most of starches, fruit and vegetables. She avoids "sweet stuff" and drinking water. She has stairs, but doesn't have any shortness of breath. \par \par \par ECG 2023: normal sinus rhythm with an atopic sinus node. This is not a junctional rhythm.\par \par SH:\par 2 daughters (nurse practitioner, works with diabled)\par Lives with with one daughter\par never smoked\par never ETOH\par \par FH: \par Mother: passed at 88 yo not sure cause ( in sleep), had hypertension\par Father: passed at 82 prostate cancer\par Brother:  61 yo colon cancer\par 2 sisters: Myeloma, \par Aunts: breast cancer

## 2023-07-12 ENCOUNTER — NON-APPOINTMENT (OUTPATIENT)
Age: 73
End: 2023-07-12

## 2023-07-12 ENCOUNTER — APPOINTMENT (OUTPATIENT)
Dept: OPHTHALMOLOGY | Facility: CLINIC | Age: 73
End: 2023-07-12
Payer: MEDICARE

## 2023-07-12 PROCEDURE — 92014 COMPRE OPH EXAM EST PT 1/>: CPT

## 2023-07-12 PROCEDURE — 92133 CPTRZD OPH DX IMG PST SGM ON: CPT

## 2024-05-03 NOTE — H&P PST ADULT - HEALTH CARE MAINTENANCE
Will be due for follow up in the next 2 mos.  Please contact her to schedule appt and labs priort to appt.   covid vaccine : Pfizer x 1 dose 3/27/21

## 2024-07-22 ENCOUNTER — APPOINTMENT (OUTPATIENT)
Dept: INTERNAL MEDICINE | Facility: CLINIC | Age: 74
End: 2024-07-22
Payer: MEDICARE

## 2024-07-22 VITALS
OXYGEN SATURATION: 99 % | WEIGHT: 224 LBS | BODY MASS INDEX: 33.95 KG/M2 | HEART RATE: 79 BPM | SYSTOLIC BLOOD PRESSURE: 138 MMHG | HEIGHT: 68 IN | DIASTOLIC BLOOD PRESSURE: 76 MMHG

## 2024-07-22 DIAGNOSIS — I10 ESSENTIAL (PRIMARY) HYPERTENSION: ICD-10-CM

## 2024-07-22 DIAGNOSIS — Z00.00 ENCOUNTER FOR GENERAL ADULT MEDICAL EXAMINATION W/OUT ABNORMAL FINDINGS: ICD-10-CM

## 2024-07-22 DIAGNOSIS — E55.9 VITAMIN D DEFICIENCY, UNSPECIFIED: ICD-10-CM

## 2024-07-22 DIAGNOSIS — E66.9 OBESITY, UNSPECIFIED: ICD-10-CM

## 2024-07-22 DIAGNOSIS — Z13.31 ENCOUNTER FOR SCREENING FOR DEPRESSION: ICD-10-CM

## 2024-07-22 PROCEDURE — 99397 PER PM REEVAL EST PAT 65+ YR: CPT

## 2024-07-22 PROCEDURE — 93000 ELECTROCARDIOGRAM COMPLETE: CPT

## 2024-07-22 NOTE — HEALTH RISK ASSESSMENT
[Fair] :  ~his/her~ mood as fair [No] : No [No falls in past year] : Patient reported no falls in the past year [0] : 2) Feeling down, depressed, or hopeless: Not at all (0) [PHQ-2 Negative - No further assessment needed] : PHQ-2 Negative - No further assessment needed [[id number]] : 0 [Never] : Never [Patient declined PAP Smear] : Patient declined PAP Smear [Patient reported colonoscopy was normal] : Patient reported colonoscopy was normal [HIV test declined] : HIV test declined [Hepatitis C test declined] : Hepatitis C test declined [Fully functional (bathing, dressing, toileting, transferring, walking, feeding)] : Fully functional (bathing, dressing, toileting, transferring, walking, feeding) [Fully functional (using the telephone, shopping, preparing meals, housekeeping, doing laundry, using] : Fully functional and needs no help or supervision to perform IADLs (using the telephone, shopping, preparing meals, housekeeping, doing laundry, using transportation, managing medications and managing finances) [MammogramComments] : F/U BR SP [PapSmearComments] : JESENIA 2018 [ColonoscopyDate] : 7/3/2024 [With Patient/Caregiver] : , with patient/caregiver [AdvancecareDate] : 07/2024

## 2024-07-22 NOTE — HISTORY OF PRESENT ILLNESS
[FreeTextEntry1] : HERE FOR Comprehensive annual examination  [de-identified] : diet- healthy exercise- none Has been in good overall health since his last CPE.  # Breast ca s/p 1996 F/U BREAST SP  # Hypertension  Diagnosis- Age: >20years ago  Current regimen:

## 2024-07-22 NOTE — PHYSICAL EXAM
[Normal TMs] : both tympanic membranes were normal [Supple] : supple [Clear to Auscultation] : lungs were clear to auscultation bilaterally [Normal S1, S2] : normal S1 and S2 [Normal Appearance] : normal in appearance [No Masses] : no palpable masses [No Nipple Discharge] : no nipple discharge [No Axillary Lymphadenopathy] : no axillary lymphadenopathy [Soft] : abdomen soft [Normal] : affect was normal and insight and judgment were intact

## 2024-07-29 LAB
25(OH)D3 SERPL-MCNC: 62.6 NG/ML
ALBUMIN SERPL ELPH-MCNC: 4 G/DL
ALP BLD-CCNC: 61 U/L
ALT SERPL-CCNC: 8 U/L
ANION GAP SERPL CALC-SCNC: 10 MMOL/L
AST SERPL-CCNC: 16 U/L
BASOPHILS # BLD AUTO: 0.03 K/UL
BASOPHILS NFR BLD AUTO: 0.6 %
BILIRUB SERPL-MCNC: 0.3 MG/DL
BUN SERPL-MCNC: 16 MG/DL
CALCIUM SERPL-MCNC: 9.6 MG/DL
CHLORIDE SERPL-SCNC: 101 MMOL/L
CHOLEST SERPL-MCNC: 192 MG/DL
CO2 SERPL-SCNC: 26 MMOL/L
CREAT SERPL-MCNC: 0.77 MG/DL
EGFR: 82 ML/MIN/1.73M2
EOSINOPHIL # BLD AUTO: 0.15 K/UL
EOSINOPHIL NFR BLD AUTO: 2.8 %
ESTIMATED AVERAGE GLUCOSE: 117 MG/DL
GLUCOSE SERPL-MCNC: 74 MG/DL
HBA1C MFR BLD HPLC: 5.7 %
HCT VFR BLD CALC: 42 %
HDLC SERPL-MCNC: 79 MG/DL
HGB BLD-MCNC: 13.4 G/DL
IMM GRANULOCYTES NFR BLD AUTO: 0.2 %
LDLC SERPL CALC-MCNC: 100 MG/DL
LYMPHOCYTES # BLD AUTO: 2.1 K/UL
LYMPHOCYTES NFR BLD AUTO: 39 %
MAN DIFF?: NORMAL
MCHC RBC-ENTMCNC: 27.8 PG
MCHC RBC-ENTMCNC: 31.9 GM/DL
MCV RBC AUTO: 87.1 FL
MONOCYTES # BLD AUTO: 0.33 K/UL
MONOCYTES NFR BLD AUTO: 6.1 %
NEUTROPHILS # BLD AUTO: 2.76 K/UL
NEUTROPHILS NFR BLD AUTO: 51.3 %
NONHDLC SERPL-MCNC: 113 MG/DL
PLATELET # BLD AUTO: 264 K/UL
POTASSIUM SERPL-SCNC: 4 MMOL/L
PROT SERPL-MCNC: 7.9 G/DL
RBC # BLD: 4.82 M/UL
RBC # FLD: 14.6 %
SODIUM SERPL-SCNC: 137 MMOL/L
TRIGL SERPL-MCNC: 73 MG/DL
TSH SERPL-ACNC: 1.01 UIU/ML
VIT B12 SERPL-MCNC: 348 PG/ML
WBC # FLD AUTO: 5.38 K/UL

## 2024-08-29 ENCOUNTER — NON-APPOINTMENT (OUTPATIENT)
Age: 74
End: 2024-08-29

## 2024-08-29 ENCOUNTER — APPOINTMENT (OUTPATIENT)
Dept: OPHTHALMOLOGY | Facility: CLINIC | Age: 74
End: 2024-08-29

## 2024-08-29 PROCEDURE — 92014 COMPRE OPH EXAM EST PT 1/>: CPT

## 2024-08-29 PROCEDURE — 92083 EXTENDED VISUAL FIELD XM: CPT

## 2024-08-29 PROCEDURE — 92133 CPTRZD OPH DX IMG PST SGM ON: CPT

## 2024-09-03 NOTE — ASSESSMENT
This note faxed.    [FreeTextEntry1] : MEDICATION RECONCILIATION DONE *Immunizations COVID -19 TOTAL VACCINE   3 Influenza   declines Pneumococcal -2019 shingles vaccine declines TDAP declines EKG done for HCM at this office today;  NSR no acute ST-T changes Lab ( venipuncture ) done today at this office Preventive medicine discussed - including importance of lifestyle modification - with incorporation of healthy diet,  recommended Diet low fat diet + regular exercise con't calcium 1200-1500mg and vit D [id number] IU daily and regular weight bearing exercise for OP prevention weight loss counseling provided as above Depression screening WITH the Patient Health Questionnaire-2 (PHQ-2), THE RESULT WAS NEGATIVE. The benefits of adequate calcium intake and routine daily cardiovascular exercise were reviewed with the patient.  The patient was informed regarding the benefits of a YEARLY screening FOR SKIN CANCER -Recommended following up with dermatology for annual skin surveillance. -Recommended following up with GYN for annual surveillance. Recommended following up with dental, opthalmology  The importance of safer-sex was discussed with the patient. DISCUSSED PRECAUTIONS AGAINST COVID19, INCLUDING MASK WEARING, SOCIAL DISTANCING AND HAND WASHING. -Follow up in 1 year for CPE / annual exam or PRN. All questions and concerns were discussed.

## 2024-10-07 ENCOUNTER — APPOINTMENT (OUTPATIENT)
Dept: INTERNAL MEDICINE | Facility: CLINIC | Age: 74
End: 2024-10-07
Payer: MEDICARE

## 2024-10-07 VITALS
BODY MASS INDEX: 35.46 KG/M2 | OXYGEN SATURATION: 97 % | DIASTOLIC BLOOD PRESSURE: 74 MMHG | HEART RATE: 92 BPM | WEIGHT: 234 LBS | HEIGHT: 68 IN | SYSTOLIC BLOOD PRESSURE: 125 MMHG

## 2024-10-07 DIAGNOSIS — M79.671 PAIN IN RIGHT FOOT: ICD-10-CM

## 2024-10-07 DIAGNOSIS — I10 ESSENTIAL (PRIMARY) HYPERTENSION: ICD-10-CM

## 2024-10-07 DIAGNOSIS — E66.9 OBESITY, UNSPECIFIED: ICD-10-CM

## 2024-10-07 DIAGNOSIS — E55.9 VITAMIN D DEFICIENCY, UNSPECIFIED: ICD-10-CM

## 2024-10-07 DIAGNOSIS — G89.29 PAIN IN RIGHT FOOT: ICD-10-CM

## 2024-10-07 PROCEDURE — 99214 OFFICE O/P EST MOD 30 MIN: CPT

## 2025-02-04 ENCOUNTER — APPOINTMENT (OUTPATIENT)
Dept: OPHTHALMOLOGY | Facility: CLINIC | Age: 75
End: 2025-02-04
Payer: MEDICARE

## 2025-02-04 ENCOUNTER — NON-APPOINTMENT (OUTPATIENT)
Age: 75
End: 2025-02-04

## 2025-02-04 PROCEDURE — 92014 COMPRE OPH EXAM EST PT 1/>: CPT

## 2025-02-04 PROCEDURE — 92134 CPTRZ OPH DX IMG PST SGM RTA: CPT

## 2025-02-04 PROCEDURE — 92136 OPHTHALMIC BIOMETRY: CPT

## 2025-05-06 ENCOUNTER — NON-APPOINTMENT (OUTPATIENT)
Age: 75
End: 2025-05-06

## 2025-05-06 ENCOUNTER — APPOINTMENT (OUTPATIENT)
Dept: INTERNAL MEDICINE | Facility: CLINIC | Age: 75
End: 2025-05-06
Payer: MEDICARE

## 2025-05-06 VITALS
HEIGHT: 68 IN | OXYGEN SATURATION: 98 % | HEART RATE: 82 BPM | WEIGHT: 239 LBS | SYSTOLIC BLOOD PRESSURE: 154 MMHG | DIASTOLIC BLOOD PRESSURE: 86 MMHG | BODY MASS INDEX: 36.22 KG/M2

## 2025-05-06 VITALS — DIASTOLIC BLOOD PRESSURE: 80 MMHG | SYSTOLIC BLOOD PRESSURE: 148 MMHG

## 2025-05-06 DIAGNOSIS — H26.9 UNSPECIFIED CATARACT: ICD-10-CM

## 2025-05-06 DIAGNOSIS — Z01.818 ENCOUNTER FOR OTHER PREPROCEDURAL EXAMINATION: ICD-10-CM

## 2025-05-06 PROCEDURE — 99203 OFFICE O/P NEW LOW 30 MIN: CPT

## 2025-05-06 PROCEDURE — 93000 ELECTROCARDIOGRAM COMPLETE: CPT

## 2025-05-06 PROCEDURE — G2211 COMPLEX E/M VISIT ADD ON: CPT

## 2025-05-06 PROCEDURE — 36415 COLL VENOUS BLD VENIPUNCTURE: CPT

## 2025-05-08 LAB
ALBUMIN SERPL ELPH-MCNC: 4.1 G/DL
ALP BLD-CCNC: 64 U/L
ALT SERPL-CCNC: 14 U/L
ANION GAP SERPL CALC-SCNC: 14 MMOL/L
APTT BLD: 32.9 SEC
AST SERPL-CCNC: 30 U/L
BASOPHILS # BLD AUTO: 0.03 K/UL
BASOPHILS NFR BLD AUTO: 0.6 %
BILIRUB SERPL-MCNC: 0.3 MG/DL
BUN SERPL-MCNC: 16 MG/DL
CALCIUM SERPL-MCNC: 9.6 MG/DL
CHLORIDE SERPL-SCNC: 103 MMOL/L
CO2 SERPL-SCNC: 25 MMOL/L
CREAT SERPL-MCNC: 0.87 MG/DL
EGFRCR SERPLBLD CKD-EPI 2021: 71 ML/MIN/1.73M2
EOSINOPHIL # BLD AUTO: 0.08 K/UL
EOSINOPHIL NFR BLD AUTO: 1.6 %
GLUCOSE SERPL-MCNC: 94 MG/DL
HCT VFR BLD CALC: 42.4 %
HGB BLD-MCNC: 13.3 G/DL
IMM GRANULOCYTES NFR BLD AUTO: 0.2 %
INR PPP: 1.03 RATIO
LYMPHOCYTES # BLD AUTO: 1.93 K/UL
LYMPHOCYTES NFR BLD AUTO: 37.9 %
MAN DIFF?: NORMAL
MCHC RBC-ENTMCNC: 27.1 PG
MCHC RBC-ENTMCNC: 31.4 G/DL
MCV RBC AUTO: 86.5 FL
MONOCYTES # BLD AUTO: 0.34 K/UL
MONOCYTES NFR BLD AUTO: 6.7 %
NEUTROPHILS # BLD AUTO: 2.7 K/UL
NEUTROPHILS NFR BLD AUTO: 53 %
PLATELET # BLD AUTO: 283 K/UL
POTASSIUM SERPL-SCNC: 3.9 MMOL/L
PROT SERPL-MCNC: 7.9 G/DL
PT BLD: 12.2 SEC
RBC # BLD: 4.9 M/UL
RBC # FLD: 14.9 %
SODIUM SERPL-SCNC: 142 MMOL/L
WBC # FLD AUTO: 5.09 K/UL

## 2025-05-13 ENCOUNTER — APPOINTMENT (OUTPATIENT)
Dept: OPHTHALMOLOGY | Facility: AMBULATORY SURGERY CENTER | Age: 75
End: 2025-05-13
Payer: MEDICARE

## 2025-05-13 PROCEDURE — 66984 XCAPSL CTRC RMVL W/O ECP: CPT | Mod: LT

## 2025-05-14 ENCOUNTER — NON-APPOINTMENT (OUTPATIENT)
Age: 75
End: 2025-05-14

## 2025-05-14 ENCOUNTER — APPOINTMENT (OUTPATIENT)
Dept: OPHTHALMOLOGY | Facility: CLINIC | Age: 75
End: 2025-05-14
Payer: MEDICARE

## 2025-05-14 PROCEDURE — 99024 POSTOP FOLLOW-UP VISIT: CPT

## 2025-05-16 ENCOUNTER — NON-APPOINTMENT (OUTPATIENT)
Age: 75
End: 2025-05-16

## 2025-05-19 DIAGNOSIS — M54.9 DORSALGIA, UNSPECIFIED: ICD-10-CM

## 2025-05-21 ENCOUNTER — NON-APPOINTMENT (OUTPATIENT)
Age: 75
End: 2025-05-21

## 2025-05-21 ENCOUNTER — APPOINTMENT (OUTPATIENT)
Dept: OPHTHALMOLOGY | Facility: CLINIC | Age: 75
End: 2025-05-21
Payer: MEDICARE

## 2025-05-21 ENCOUNTER — APPOINTMENT (OUTPATIENT)
Dept: ORTHOPEDIC SURGERY | Facility: CLINIC | Age: 75
End: 2025-05-21
Payer: MEDICARE

## 2025-05-21 DIAGNOSIS — M25.551 PAIN IN RIGHT HIP: ICD-10-CM

## 2025-05-21 DIAGNOSIS — M24.851 OTHER SPECIFIC JOINT DERANGEMENTS OF RIGHT HIP, NOT ELSEWHERE CLASSIFIED: ICD-10-CM

## 2025-05-21 DIAGNOSIS — G89.29 PAIN IN RIGHT KNEE: ICD-10-CM

## 2025-05-21 DIAGNOSIS — M25.561 PAIN IN RIGHT KNEE: ICD-10-CM

## 2025-05-21 DIAGNOSIS — M23.671 OTHER SPONTANEOUS DISRUPTION OF CAPSULAR LIGAMENT OF RIGHT KNEE: ICD-10-CM

## 2025-05-21 DIAGNOSIS — Z87.39 PERSONAL HISTORY OF OTHER DISEASES OF THE MUSCULOSKELETAL SYSTEM AND CONNECTIVE TISSUE: ICD-10-CM

## 2025-05-21 PROCEDURE — 73564 X-RAY EXAM KNEE 4 OR MORE: CPT | Mod: RT

## 2025-05-21 PROCEDURE — 99024 POSTOP FOLLOW-UP VISIT: CPT

## 2025-05-21 PROCEDURE — 92136 OPHTHALMIC BIOMETRY: CPT | Mod: 26,RT

## 2025-05-21 PROCEDURE — 72100 X-RAY EXAM L-S SPINE 2/3 VWS: CPT

## 2025-05-21 PROCEDURE — 73502 X-RAY EXAM HIP UNI 2-3 VIEWS: CPT | Mod: RT

## 2025-05-21 PROCEDURE — 99204 OFFICE O/P NEW MOD 45 MIN: CPT

## 2025-06-02 ENCOUNTER — APPOINTMENT (OUTPATIENT)
Dept: INTERNAL MEDICINE | Facility: CLINIC | Age: 75
End: 2025-06-02
Payer: MEDICARE

## 2025-06-02 VITALS
SYSTOLIC BLOOD PRESSURE: 152 MMHG | BODY MASS INDEX: 36.37 KG/M2 | HEART RATE: 87 BPM | DIASTOLIC BLOOD PRESSURE: 80 MMHG | WEIGHT: 240 LBS | OXYGEN SATURATION: 97 % | HEIGHT: 68 IN

## 2025-06-02 VITALS — SYSTOLIC BLOOD PRESSURE: 140 MMHG | DIASTOLIC BLOOD PRESSURE: 88 MMHG

## 2025-06-02 DIAGNOSIS — H26.9 UNSPECIFIED CATARACT: ICD-10-CM

## 2025-06-02 DIAGNOSIS — Z01.818 ENCOUNTER FOR OTHER PREPROCEDURAL EXAMINATION: ICD-10-CM

## 2025-06-02 PROCEDURE — G2211 COMPLEX E/M VISIT ADD ON: CPT

## 2025-06-02 PROCEDURE — 99213 OFFICE O/P EST LOW 20 MIN: CPT

## 2025-06-04 ENCOUNTER — NON-APPOINTMENT (OUTPATIENT)
Age: 75
End: 2025-06-04

## 2025-06-04 ENCOUNTER — APPOINTMENT (OUTPATIENT)
Dept: OPHTHALMOLOGY | Facility: CLINIC | Age: 75
End: 2025-06-04
Payer: MEDICARE

## 2025-06-04 PROCEDURE — 99024 POSTOP FOLLOW-UP VISIT: CPT

## 2025-06-08 ENCOUNTER — OUTPATIENT (OUTPATIENT)
Dept: OUTPATIENT SERVICES | Facility: HOSPITAL | Age: 75
LOS: 1 days | End: 2025-06-08
Payer: MEDICARE

## 2025-06-08 DIAGNOSIS — Z98.890 OTHER SPECIFIED POSTPROCEDURAL STATES: Chronic | ICD-10-CM

## 2025-06-08 DIAGNOSIS — Z90.13 ACQUIRED ABSENCE OF BILATERAL BREASTS AND NIPPLES: Chronic | ICD-10-CM

## 2025-06-08 DIAGNOSIS — M23.671 OTHER SPONTANEOUS DISRUPTION OF CAPSULAR LIGAMENT OF RIGHT KNEE: ICD-10-CM

## 2025-06-08 PROCEDURE — 73721 MRI JNT OF LWR EXTRE W/O DYE: CPT

## 2025-06-11 ENCOUNTER — APPOINTMENT (OUTPATIENT)
Dept: OPHTHALMOLOGY | Facility: AMBULATORY MEDICAL SERVICES | Age: 75
End: 2025-06-11
Payer: MEDICARE

## 2025-06-11 PROCEDURE — 66984 XCAPSL CTRC RMVL W/O ECP: CPT | Mod: RT,79

## 2025-06-12 ENCOUNTER — NON-APPOINTMENT (OUTPATIENT)
Age: 75
End: 2025-06-12

## 2025-06-12 ENCOUNTER — APPOINTMENT (OUTPATIENT)
Dept: OPHTHALMOLOGY | Facility: CLINIC | Age: 75
End: 2025-06-12
Payer: MEDICARE

## 2025-06-12 PROCEDURE — 99024 POSTOP FOLLOW-UP VISIT: CPT

## 2025-06-18 ENCOUNTER — APPOINTMENT (OUTPATIENT)
Dept: OPHTHALMOLOGY | Facility: CLINIC | Age: 75
End: 2025-06-18
Payer: MEDICARE

## 2025-06-18 ENCOUNTER — NON-APPOINTMENT (OUTPATIENT)
Age: 75
End: 2025-06-18

## 2025-06-18 PROCEDURE — 99024 POSTOP FOLLOW-UP VISIT: CPT

## 2025-06-25 ENCOUNTER — APPOINTMENT (OUTPATIENT)
Dept: OPHTHALMOLOGY | Facility: CLINIC | Age: 75
End: 2025-06-25
Payer: MEDICARE

## 2025-06-25 ENCOUNTER — NON-APPOINTMENT (OUTPATIENT)
Age: 75
End: 2025-06-25

## 2025-06-25 PROCEDURE — 99024 POSTOP FOLLOW-UP VISIT: CPT

## 2025-07-01 ENCOUNTER — APPOINTMENT (OUTPATIENT)
Dept: ORTHOPEDIC SURGERY | Facility: CLINIC | Age: 75
End: 2025-07-01
Payer: MEDICARE

## 2025-07-01 PROBLEM — M16.11 ARTHRITIS OF RIGHT HIP: Status: ACTIVE | Noted: 2025-07-01

## 2025-07-01 PROCEDURE — 99213 OFFICE O/P EST LOW 20 MIN: CPT

## 2025-07-11 ENCOUNTER — APPOINTMENT (OUTPATIENT)
Dept: OPHTHALMOLOGY | Facility: CLINIC | Age: 75
End: 2025-07-11
Payer: MEDICARE

## 2025-07-11 ENCOUNTER — NON-APPOINTMENT (OUTPATIENT)
Age: 75
End: 2025-07-11

## 2025-07-11 PROCEDURE — 99024 POSTOP FOLLOW-UP VISIT: CPT

## 2025-07-21 ENCOUNTER — APPOINTMENT (OUTPATIENT)
Dept: INTERNAL MEDICINE | Facility: CLINIC | Age: 75
End: 2025-07-21
Payer: MEDICARE

## 2025-07-21 DIAGNOSIS — Z13.1 ENCOUNTER FOR SCREENING FOR DIABETES MELLITUS: ICD-10-CM

## 2025-07-21 DIAGNOSIS — Z13.220 ENCOUNTER FOR SCREENING FOR LIPOID DISORDERS: ICD-10-CM

## 2025-07-21 DIAGNOSIS — Z13.29 ENCOUNTER FOR SCREENING FOR OTHER SUSPECTED ENDOCRINE DISORDER: ICD-10-CM

## 2025-07-21 DIAGNOSIS — Z13.21 ENCOUNTER FOR SCREENING FOR NUTRITIONAL DISORDER: ICD-10-CM

## 2025-07-21 PROCEDURE — 36415 COLL VENOUS BLD VENIPUNCTURE: CPT

## 2025-07-23 LAB
25(OH)D3 SERPL-MCNC: 50.2 NG/ML
ALBUMIN SERPL ELPH-MCNC: 4 G/DL
ALP BLD-CCNC: 52 U/L
ALT SERPL-CCNC: 12 U/L
ANION GAP SERPL CALC-SCNC: 17 MMOL/L
AST SERPL-CCNC: 19 U/L
BASOPHILS # BLD AUTO: 0.04 K/UL
BASOPHILS NFR BLD AUTO: 0.6 %
BILIRUB SERPL-MCNC: 0.3 MG/DL
BUN SERPL-MCNC: 17 MG/DL
CALCIUM SERPL-MCNC: 9.5 MG/DL
CHLORIDE SERPL-SCNC: 101 MMOL/L
CHOLEST SERPL-MCNC: 169 MG/DL
CO2 SERPL-SCNC: 22 MMOL/L
CREAT SERPL-MCNC: 1 MG/DL
EGFRCR SERPLBLD CKD-EPI 2021: 60 ML/MIN/1.73M2
EOSINOPHIL # BLD AUTO: 0.14 K/UL
EOSINOPHIL NFR BLD AUTO: 2 %
ESTIMATED AVERAGE GLUCOSE: 128 MG/DL
GLUCOSE SERPL-MCNC: 83 MG/DL
HBA1C MFR BLD HPLC: 6.1 %
HCT VFR BLD CALC: 41.1 %
HDLC SERPL-MCNC: 64 MG/DL
HGB BLD-MCNC: 12.6 G/DL
IMM GRANULOCYTES NFR BLD AUTO: 0.1 %
LDLC SERPL-MCNC: 91 MG/DL
LYMPHOCYTES # BLD AUTO: 2.65 K/UL
LYMPHOCYTES NFR BLD AUTO: 37.9 %
MAN DIFF?: NORMAL
MCHC RBC-ENTMCNC: 27.8 PG
MCHC RBC-ENTMCNC: 30.7 G/DL
MCV RBC AUTO: 90.7 FL
MONOCYTES # BLD AUTO: 0.53 K/UL
MONOCYTES NFR BLD AUTO: 7.6 %
NEUTROPHILS # BLD AUTO: 3.63 K/UL
NEUTROPHILS NFR BLD AUTO: 51.8 %
NONHDLC SERPL-MCNC: 105 MG/DL
PLATELET # BLD AUTO: 269 K/UL
POTASSIUM SERPL-SCNC: 4 MMOL/L
PROT SERPL-MCNC: 7.8 G/DL
RBC # BLD: 4.53 M/UL
RBC # FLD: 16.5 %
SODIUM SERPL-SCNC: 141 MMOL/L
TRIGL SERPL-MCNC: 75 MG/DL
TSH SERPL-ACNC: 0.97 UIU/ML
VIT B12 SERPL-MCNC: 457 PG/ML
WBC # FLD AUTO: 7 K/UL

## 2025-07-24 ENCOUNTER — APPOINTMENT (OUTPATIENT)
Dept: INTERNAL MEDICINE | Facility: CLINIC | Age: 75
End: 2025-07-24
Payer: MEDICARE

## 2025-07-24 VITALS — SYSTOLIC BLOOD PRESSURE: 138 MMHG | DIASTOLIC BLOOD PRESSURE: 70 MMHG

## 2025-07-24 VITALS
WEIGHT: 234 LBS | BODY MASS INDEX: 35.46 KG/M2 | HEIGHT: 68 IN | SYSTOLIC BLOOD PRESSURE: 149 MMHG | DIASTOLIC BLOOD PRESSURE: 83 MMHG | OXYGEN SATURATION: 99 % | HEART RATE: 83 BPM

## 2025-07-24 DIAGNOSIS — Z12.83 ENCOUNTER FOR SCREENING FOR MALIGNANT NEOPLASM OF SKIN: ICD-10-CM

## 2025-07-24 DIAGNOSIS — R73.03 PREDIABETES.: ICD-10-CM

## 2025-07-24 DIAGNOSIS — Z00.00 ENCOUNTER FOR GENERAL ADULT MEDICAL EXAMINATION W/OUT ABNORMAL FINDINGS: ICD-10-CM

## 2025-07-24 PROCEDURE — G0439: CPT

## 2025-08-04 ENCOUNTER — NON-APPOINTMENT (OUTPATIENT)
Age: 75
End: 2025-08-04

## 2025-08-04 ENCOUNTER — APPOINTMENT (OUTPATIENT)
Dept: CARDIOLOGY | Facility: CLINIC | Age: 75
End: 2025-08-04
Payer: MEDICARE

## 2025-08-04 VITALS
OXYGEN SATURATION: 99 % | WEIGHT: 234 LBS | DIASTOLIC BLOOD PRESSURE: 72 MMHG | HEART RATE: 93 BPM | SYSTOLIC BLOOD PRESSURE: 130 MMHG | BODY MASS INDEX: 35.58 KG/M2

## 2025-08-04 DIAGNOSIS — I10 ESSENTIAL (PRIMARY) HYPERTENSION: ICD-10-CM

## 2025-08-04 DIAGNOSIS — R94.31 ABNORMAL ELECTROCARDIOGRAM [ECG] [EKG]: ICD-10-CM

## 2025-08-04 PROCEDURE — 93000 ELECTROCARDIOGRAM COMPLETE: CPT

## 2025-08-04 PROCEDURE — 99213 OFFICE O/P EST LOW 20 MIN: CPT | Mod: 25

## 2025-08-05 ENCOUNTER — APPOINTMENT (OUTPATIENT)
Dept: CARDIOLOGY | Facility: CLINIC | Age: 75
End: 2025-08-05
Payer: MEDICARE

## 2025-08-05 PROCEDURE — 93306 TTE W/DOPPLER COMPLETE: CPT

## 2025-08-06 DIAGNOSIS — I51.9 HEART DISEASE, UNSPECIFIED: ICD-10-CM

## 2025-08-06 RX ORDER — CARVEDILOL 3.12 MG/1
3.12 TABLET, FILM COATED ORAL
Qty: 60 | Refills: 1 | Status: ACTIVE | COMMUNITY
Start: 2025-08-06 | End: 1900-01-01

## 2025-08-15 ENCOUNTER — APPOINTMENT (OUTPATIENT)
Dept: DERMATOLOGY | Facility: CLINIC | Age: 75
End: 2025-08-15
Payer: MEDICARE

## 2025-08-15 DIAGNOSIS — L82.1 OTHER SEBORRHEIC KERATOSIS: ICD-10-CM

## 2025-08-15 DIAGNOSIS — L91.8 OTHER HYPERTROPHIC DISORDERS OF THE SKIN: ICD-10-CM

## 2025-08-15 PROCEDURE — 99203 OFFICE O/P NEW LOW 30 MIN: CPT

## 2025-08-25 ENCOUNTER — APPOINTMENT (OUTPATIENT)
Dept: CARDIOLOGY | Facility: CLINIC | Age: 75
End: 2025-08-25
Payer: MEDICARE

## 2025-08-25 VITALS
BODY MASS INDEX: 36.08 KG/M2 | SYSTOLIC BLOOD PRESSURE: 130 MMHG | OXYGEN SATURATION: 97 % | HEART RATE: 79 BPM | WEIGHT: 237.31 LBS | DIASTOLIC BLOOD PRESSURE: 65 MMHG

## 2025-08-25 DIAGNOSIS — I10 ESSENTIAL (PRIMARY) HYPERTENSION: ICD-10-CM

## 2025-08-25 DIAGNOSIS — I51.9 HEART DISEASE, UNSPECIFIED: ICD-10-CM

## 2025-08-25 PROCEDURE — 93000 ELECTROCARDIOGRAM COMPLETE: CPT

## 2025-08-25 PROCEDURE — G2211 COMPLEX E/M VISIT ADD ON: CPT

## 2025-08-25 PROCEDURE — 99214 OFFICE O/P EST MOD 30 MIN: CPT

## 2025-09-09 ENCOUNTER — APPOINTMENT (OUTPATIENT)
Dept: CARDIOLOGY | Facility: CLINIC | Age: 75
End: 2025-09-09
Payer: MEDICARE

## 2025-09-09 VITALS — WEIGHT: 233 LBS | BODY MASS INDEX: 35.43 KG/M2

## 2025-09-09 VITALS — HEART RATE: 67 BPM | DIASTOLIC BLOOD PRESSURE: 83 MMHG | SYSTOLIC BLOOD PRESSURE: 144 MMHG | OXYGEN SATURATION: 99 %

## 2025-09-09 DIAGNOSIS — R94.31 ABNORMAL ELECTROCARDIOGRAM [ECG] [EKG]: ICD-10-CM

## 2025-09-09 DIAGNOSIS — I10 ESSENTIAL (PRIMARY) HYPERTENSION: ICD-10-CM

## 2025-09-09 DIAGNOSIS — I51.9 HEART DISEASE, UNSPECIFIED: ICD-10-CM

## 2025-09-09 PROCEDURE — 93000 ELECTROCARDIOGRAM COMPLETE: CPT

## 2025-09-09 PROCEDURE — 99214 OFFICE O/P EST MOD 30 MIN: CPT | Mod: 25
